# Patient Record
Sex: MALE | Race: WHITE | NOT HISPANIC OR LATINO
[De-identification: names, ages, dates, MRNs, and addresses within clinical notes are randomized per-mention and may not be internally consistent; named-entity substitution may affect disease eponyms.]

---

## 2018-03-02 ENCOUNTER — APPOINTMENT (OUTPATIENT)
Dept: PHYSICAL MEDICINE AND REHAB | Facility: CLINIC | Age: 83
End: 2018-03-02
Payer: MEDICARE

## 2018-03-02 VITALS
HEART RATE: 63 BPM | BODY MASS INDEX: 31.44 KG/M2 | TEMPERATURE: 97.5 F | OXYGEN SATURATION: 99 % | DIASTOLIC BLOOD PRESSURE: 87 MMHG | WEIGHT: 245 LBS | SYSTOLIC BLOOD PRESSURE: 153 MMHG | HEIGHT: 74 IN

## 2018-03-02 PROCEDURE — 99203 OFFICE O/P NEW LOW 30 MIN: CPT | Mod: GC

## 2018-04-13 ENCOUNTER — APPOINTMENT (OUTPATIENT)
Dept: PHYSICAL MEDICINE AND REHAB | Facility: CLINIC | Age: 83
End: 2018-04-13
Payer: MEDICARE

## 2018-04-13 VITALS
SYSTOLIC BLOOD PRESSURE: 145 MMHG | OXYGEN SATURATION: 98 % | DIASTOLIC BLOOD PRESSURE: 76 MMHG | HEART RATE: 64 BPM | TEMPERATURE: 97.5 F

## 2018-04-13 PROCEDURE — 99213 OFFICE O/P EST LOW 20 MIN: CPT

## 2018-04-20 ENCOUNTER — APPOINTMENT (OUTPATIENT)
Dept: GASTROENTEROLOGY | Facility: CLINIC | Age: 83
End: 2018-04-20
Payer: MEDICARE

## 2018-04-20 VITALS
WEIGHT: 240 LBS | HEIGHT: 74 IN | BODY MASS INDEX: 30.8 KG/M2 | SYSTOLIC BLOOD PRESSURE: 130 MMHG | TEMPERATURE: 96.9 F | DIASTOLIC BLOOD PRESSURE: 82 MMHG

## 2018-04-20 DIAGNOSIS — R19.5 OTHER FECAL ABNORMALITIES: ICD-10-CM

## 2018-04-20 PROCEDURE — 99203 OFFICE O/P NEW LOW 30 MIN: CPT

## 2018-04-24 ENCOUNTER — OUTPATIENT (OUTPATIENT)
Dept: OUTPATIENT SERVICES | Facility: HOSPITAL | Age: 83
LOS: 1 days | End: 2018-04-24
Payer: COMMERCIAL

## 2018-04-24 ENCOUNTER — APPOINTMENT (OUTPATIENT)
Dept: RADIOLOGY | Facility: CLINIC | Age: 83
End: 2018-04-24
Payer: MEDICARE

## 2018-04-24 DIAGNOSIS — Z00.8 ENCOUNTER FOR OTHER GENERAL EXAMINATION: ICD-10-CM

## 2018-04-24 PROCEDURE — 74018 RADEX ABDOMEN 1 VIEW: CPT

## 2018-04-24 PROCEDURE — 74018 RADEX ABDOMEN 1 VIEW: CPT | Mod: 26

## 2018-04-30 ENCOUNTER — RX RENEWAL (OUTPATIENT)
Age: 83
End: 2018-04-30

## 2018-05-07 ENCOUNTER — MED ADMIN CHARGE (OUTPATIENT)
Age: 83
End: 2018-05-07

## 2018-05-21 ENCOUNTER — APPOINTMENT (OUTPATIENT)
Dept: PHYSICAL MEDICINE AND REHAB | Facility: CLINIC | Age: 83
End: 2018-05-21
Payer: MEDICARE

## 2018-05-21 VITALS
HEART RATE: 64 BPM | OXYGEN SATURATION: 98 % | DIASTOLIC BLOOD PRESSURE: 78 MMHG | TEMPERATURE: 97.8 F | SYSTOLIC BLOOD PRESSURE: 149 MMHG

## 2018-05-21 PROCEDURE — 99214 OFFICE O/P EST MOD 30 MIN: CPT

## 2018-06-06 ENCOUNTER — APPOINTMENT (OUTPATIENT)
Dept: GASTROENTEROLOGY | Facility: CLINIC | Age: 83
End: 2018-06-06
Payer: MEDICARE

## 2018-06-06 VITALS
HEART RATE: 66 BPM | SYSTOLIC BLOOD PRESSURE: 132 MMHG | HEIGHT: 74 IN | TEMPERATURE: 97.6 F | BODY MASS INDEX: 31.06 KG/M2 | DIASTOLIC BLOOD PRESSURE: 80 MMHG | RESPIRATION RATE: 16 BRPM | WEIGHT: 242 LBS

## 2018-06-06 PROCEDURE — 99214 OFFICE O/P EST MOD 30 MIN: CPT

## 2018-06-19 ENCOUNTER — OTHER (OUTPATIENT)
Age: 83
End: 2018-06-19

## 2018-07-11 ENCOUNTER — APPOINTMENT (OUTPATIENT)
Dept: VASCULAR SURGERY | Facility: CLINIC | Age: 83
End: 2018-07-11

## 2018-08-07 ENCOUNTER — APPOINTMENT (OUTPATIENT)
Dept: VASCULAR SURGERY | Facility: CLINIC | Age: 83
End: 2018-08-07
Payer: MEDICARE

## 2018-08-07 ENCOUNTER — TRANSCRIPTION ENCOUNTER (OUTPATIENT)
Age: 83
End: 2018-08-07

## 2018-08-07 VITALS
SYSTOLIC BLOOD PRESSURE: 128 MMHG | BODY MASS INDEX: 31.44 KG/M2 | TEMPERATURE: 97.5 F | DIASTOLIC BLOOD PRESSURE: 80 MMHG | HEIGHT: 74 IN | WEIGHT: 245 LBS | HEART RATE: 71 BPM

## 2018-08-07 DIAGNOSIS — Z95.0 PRESENCE OF CARDIAC PACEMAKER: ICD-10-CM

## 2018-08-07 DIAGNOSIS — Z84.1 FAMILY HISTORY OF DISORDERS OF KIDNEY AND URETER: ICD-10-CM

## 2018-08-07 DIAGNOSIS — Z80.3 FAMILY HISTORY OF MALIGNANT NEOPLASM OF BREAST: ICD-10-CM

## 2018-08-07 DIAGNOSIS — Z78.9 OTHER SPECIFIED HEALTH STATUS: ICD-10-CM

## 2018-08-07 DIAGNOSIS — Z87.39 PERSONAL HISTORY OF OTHER DISEASES OF THE MUSCULOSKELETAL SYSTEM AND CONNECTIVE TISSUE: ICD-10-CM

## 2018-08-07 PROCEDURE — 99202 OFFICE O/P NEW SF 15 MIN: CPT

## 2018-08-07 PROCEDURE — 93970 EXTREMITY STUDY: CPT

## 2018-09-24 ENCOUNTER — APPOINTMENT (OUTPATIENT)
Dept: PHYSICAL MEDICINE AND REHAB | Facility: CLINIC | Age: 83
End: 2018-09-24
Payer: MEDICARE

## 2018-09-24 VITALS — HEART RATE: 64 BPM | OXYGEN SATURATION: 99 % | TEMPERATURE: 97.6 F

## 2018-09-24 DIAGNOSIS — M48.062 SPINAL STENOSIS, LUMBAR REGION WITH NEUROGENIC CLAUDICATION: ICD-10-CM

## 2018-09-24 PROCEDURE — 99213 OFFICE O/P EST LOW 20 MIN: CPT

## 2018-09-25 ENCOUNTER — TRANSCRIPTION ENCOUNTER (OUTPATIENT)
Age: 83
End: 2018-09-25

## 2018-12-03 ENCOUNTER — APPOINTMENT (OUTPATIENT)
Dept: PHYSICAL MEDICINE AND REHAB | Facility: CLINIC | Age: 83
End: 2018-12-03
Payer: MEDICARE

## 2018-12-03 VITALS — DIASTOLIC BLOOD PRESSURE: 87 MMHG | SYSTOLIC BLOOD PRESSURE: 140 MMHG | HEART RATE: 76 BPM

## 2018-12-03 PROCEDURE — 99213 OFFICE O/P EST LOW 20 MIN: CPT

## 2018-12-04 ENCOUNTER — TRANSCRIPTION ENCOUNTER (OUTPATIENT)
Age: 83
End: 2018-12-04

## 2019-04-01 ENCOUNTER — APPOINTMENT (OUTPATIENT)
Dept: PHYSICAL MEDICINE AND REHAB | Facility: CLINIC | Age: 84
End: 2019-04-01
Payer: MEDICARE

## 2019-04-01 VITALS — OXYGEN SATURATION: 98 % | SYSTOLIC BLOOD PRESSURE: 144 MMHG | DIASTOLIC BLOOD PRESSURE: 79 MMHG | HEART RATE: 67 BPM

## 2019-04-01 PROCEDURE — 99214 OFFICE O/P EST MOD 30 MIN: CPT

## 2019-04-01 NOTE — PHYSICAL EXAM
[Normal] : Oriented to person, place, and time, insight and judgement were intact and the affect was normal [de-identified] : The sclera and conjunctiva were normal [de-identified] : Ears and nose were normal in appearance [de-identified] : The appearance of the neck was normal, no visible neck mass [de-identified] : good respiratory effort, no resp distress, normal chest expansion [de-identified] : warm and well perfused [de-identified] : Mild lumbar spine tenderness along paraspinals [de-identified] : 4+/5 bl HFs/KE/ADF/APF [de-identified] : venous stasis changes of bl LEs [de-identified] : DTRs 1+, amb w rollator, fair-good balance.

## 2019-04-01 NOTE — REVIEW OF SYSTEMS
[Patient Intake Form Reviewed] : Patient intake form was reviewed [Joint Pain] : joint pain [Muscle Pain] : muscle pain [Difficulty Walking] : difficulty walking [Negative] : Psychiatric [Fatigue] : no fatigue [Headache] : no headaches [Dizziness] : no dizziness [Fainting] : no fainting [FreeTextEntry9] : Low Back Pain [de-identified] : no ulcers, chronic stasis changes

## 2019-04-01 NOTE — HISTORY OF PRESENT ILLNESS
[FreeTextEntry1] : Doing well. No new medical issues\par Still gets pain if he stands for a minute- pain starts to radiate down the legs to the feet.Feels a stretching feeling. \par Taking Gabapentin 400mg TID which he tolerates.\par Considering medical marijuana- planning to discuss with his PCP. \par Ambulating with rollator- has not had any falls recently. \par

## 2019-04-01 NOTE — ASSESSMENT
[FreeTextEntry1] : - Continue Gabapentin- increase from 400 mg TID to 400-400-600; Tyelenol prn\par - To discuss w PMD option of medical marijuana\par - Referred to PT for balance, LE strengthening\par - Can't get HARI due low platelets and h/o DVT\par - Discussed with patient red flags such as bladder/bowel incontinence, weakness, significant increase in pain level in which case patient should immediately present to Emergency Room.\par \par \par \par

## 2019-04-02 ENCOUNTER — TRANSCRIPTION ENCOUNTER (OUTPATIENT)
Age: 84
End: 2019-04-02

## 2019-06-26 ENCOUNTER — RX RENEWAL (OUTPATIENT)
Age: 84
End: 2019-06-26

## 2019-08-12 ENCOUNTER — RX RENEWAL (OUTPATIENT)
Age: 84
End: 2019-08-12

## 2019-10-10 ENCOUNTER — APPOINTMENT (OUTPATIENT)
Dept: GASTROENTEROLOGY | Facility: CLINIC | Age: 84
End: 2019-10-10
Payer: MEDICARE

## 2019-10-10 VITALS
WEIGHT: 248 LBS | SYSTOLIC BLOOD PRESSURE: 101 MMHG | DIASTOLIC BLOOD PRESSURE: 71 MMHG | HEIGHT: 74 IN | HEART RATE: 81 BPM | BODY MASS INDEX: 31.83 KG/M2

## 2019-10-10 DIAGNOSIS — R55 SYNCOPE AND COLLAPSE: ICD-10-CM

## 2019-10-10 DIAGNOSIS — R19.4 CHANGE IN BOWEL HABIT: ICD-10-CM

## 2019-10-10 DIAGNOSIS — E66.3 OVERWEIGHT: ICD-10-CM

## 2019-10-10 DIAGNOSIS — K21.9 GASTRO-ESOPHAGEAL REFLUX DISEASE W/OUT ESOPHAGITIS: ICD-10-CM

## 2019-10-10 DIAGNOSIS — N18.3 CHRONIC KIDNEY DISEASE, STAGE 3 (MODERATE): ICD-10-CM

## 2019-10-10 DIAGNOSIS — R13.10 DYSPHAGIA, UNSPECIFIED: ICD-10-CM

## 2019-10-10 DIAGNOSIS — K59.09 OTHER CONSTIPATION: ICD-10-CM

## 2019-10-10 DIAGNOSIS — E21.3 HYPERPARATHYROIDISM, UNSPECIFIED: ICD-10-CM

## 2019-10-10 DIAGNOSIS — E66.9 OBESITY, UNSPECIFIED: ICD-10-CM

## 2019-10-10 DIAGNOSIS — Z86.010 PERSONAL HISTORY OF COLONIC POLYPS: ICD-10-CM

## 2019-10-10 DIAGNOSIS — R60.0 LOCALIZED EDEMA: ICD-10-CM

## 2019-10-10 PROCEDURE — 82274 ASSAY TEST FOR BLOOD FECAL: CPT | Mod: QW

## 2019-10-10 PROCEDURE — 99204 OFFICE O/P NEW MOD 45 MIN: CPT

## 2019-10-10 PROCEDURE — 99214 OFFICE O/P EST MOD 30 MIN: CPT

## 2019-10-10 RX ORDER — GABAPENTIN 600 MG/1
600 TABLET, COATED ORAL
Qty: 30 | Refills: 2 | Status: DISCONTINUED | COMMUNITY
Start: 2019-04-01 | End: 2019-10-10

## 2019-10-10 NOTE — REVIEW OF SYSTEMS
[Feeling Tired] : feeling tired [Sore Throat] : sore throat [Muscle Weakness] : muscle weakness [Negative] : Heme/Lymph [As Noted in HPI] : as noted in HPI

## 2019-10-10 NOTE — HISTORY OF PRESENT ILLNESS
[FreeTextEntry1] : Mayo has been having issues with constipation for quite some time. He was seen by Dr. Vaughn last year, advised to take Metamucil, with improvement. However, this past summer, he needed to spend at least 10 minutes on the toilet, with an initial hard stool, then with sense of urgency, incomplete evacuation and frequent wiping of softer stool. He changed Metamucil dosing to 2 tablespoons at night, which seemed to work fairly well. He has never tried MiraLax. He recalls polyps noted at last colonoscopy around 2007. He also reports phlegm sticking in the throat, frequent coughing in order to mobilize it. He has had episodic nonprogressive dysphagia, with some early satiety and decreased appetite over the past six months, maintained on omeprazole 20 mg daily.

## 2019-10-10 NOTE — PHYSICAL EXAM
[General Appearance - Alert] : alert [General Appearance - In No Acute Distress] : in no acute distress [General Appearance - Well Nourished] : well nourished [General Appearance - Well Developed] : well developed [Outer Ear] : the ears and nose were normal in appearance [Sclera] : the sclera and conjunctiva were normal [Oropharynx] : the oropharynx was normal [Neck Cervical Mass (___cm)] : no neck mass was observed [Jugular Venous Distention Increased] : there was no jugular-venous distention [Neck Appearance] : the appearance of the neck was normal [Thyroid Nodule] : there were no palpable thyroid nodules [Thyroid Diffuse Enlargement] : the thyroid was not enlarged [Auscultation Breath Sounds / Voice Sounds] : lungs were clear to auscultation bilaterally [Heart Rate And Rhythm] : heart rate was normal and rhythm regular [Heart Sounds Pericardial Friction Rub] : no pericardial rub [Heart Sounds Gallop] : no gallops [Heart Sounds] : normal S1 and S2 [Full Pulse] : the pedal pulses are present [Abdomen Soft] : soft [Abdomen Tenderness] : non-tender [Bowel Sounds] : normal bowel sounds [] : no hepato-splenomegaly [Abdomen Mass (___ Cm)] : no abdominal mass palpated [Abdomen Hernia] : no hernia was discovered [Normal Sphincter Tone] : normal sphincter tone [No Rectal Mass] : no rectal mass [Prostate Size___ (Scale 0-4)] : prostate size was [unfilled] on a scale of 0-4 [Cervical Lymph Nodes Enlarged Anterior Bilaterally] : anterior cervical [Cervical Lymph Nodes Enlarged Posterior Bilaterally] : posterior cervical [Axillary Lymph Nodes Enlarged Bilaterally] : axillary [Supraclavicular Lymph Nodes Enlarged Bilaterally] : supraclavicular [Inguinal Lymph Nodes Enlarged Bilaterally] : inguinal [Skin Color & Pigmentation] : normal skin color and pigmentation [Femoral Lymph Nodes Enlarged Bilaterally] : femoral [Impaired Insight] : insight and judgment were intact [Oriented To Time, Place, And Person] : oriented to person, place, and time [Affect] : the affect was normal [Internal Hemorrhoid] : no internal hemorrhoids [External Hemorrhoid] : no external hemorrhoids [Occult Blood Positive] : stool was negative for occult blood [Prostate Tenderness] : was not tender [FreeTextEntry1] : uses a walker

## 2019-10-10 NOTE — ASSESSMENT
[FreeTextEntry1] : 1. Chronic constipation with subtle change in bowels, urgency, frequent wiping; remote history of colonic polyps--although underlying neoplasm is possible, we are looking for simple easy to treat conditions, such as underlying functional disorder, medications contributing to her bowel issues, etc.\par 2. Chronic GERD, with nonprogressive dysphagia, recent early satiety and decreased appetite.\par 3. Obesity.\par 4. 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 Hyperlipidemia.\par 7. BPH.\par 8. Low back pain, spinal stenosis.\par 9. Ex-smoker.\par 10. Hyperparathyroidism.\par 11. Status post cholecystectomy, T&A.\par 12. Allergic to Lovenox.\par \par Plan:\par 1. Medical records reviewed.\par 2. Dr. Soraya Mcdermott to forward labs for my review.\par 3. Change Metamucil dosing to 1 tablespoon twice daily rather than 2 tbsp at night.\par 4. If bowel symptoms continue to be problematic, try low-dose MiraLax daily.\par 5. No plans for colonoscopy or cross-sectional imaging at this time.\par 6. Return here as needed.

## 2019-10-10 NOTE — CONSULT LETTER
[Dear  ___] : Dear  [unfilled], [Please see my note below.] : Please see my note below. [Consult Closing:] : Thank you very much for allowing me to participate in the care of this patient.  If you have any questions, please do not hesitate to contact me. [Consult Letter:] : I had the pleasure of evaluating your patient, [unfilled]. [Sincerely,] : Sincerely, [FreeTextEntry3] : Kris Venegas M.D.\par

## 2019-11-21 ENCOUNTER — RX RENEWAL (OUTPATIENT)
Age: 84
End: 2019-11-21

## 2019-11-21 RX ORDER — GABAPENTIN 400 MG/1
400 CAPSULE ORAL
Qty: 60 | Refills: 1 | Status: ACTIVE | COMMUNITY
Start: 2019-08-12 | End: 1900-01-01

## 2020-01-31 ENCOUNTER — RX RENEWAL (OUTPATIENT)
Age: 85
End: 2020-01-31

## 2020-01-31 RX ORDER — GABAPENTIN 600 MG/1
600 TABLET, COATED ORAL
Qty: 30 | Refills: 3 | Status: ACTIVE | COMMUNITY
Start: 2019-08-12 | End: 1900-01-01

## 2020-07-24 ENCOUNTER — APPOINTMENT (OUTPATIENT)
Dept: VASCULAR SURGERY | Facility: CLINIC | Age: 85
End: 2020-07-24
Payer: MEDICARE

## 2020-07-24 VITALS
SYSTOLIC BLOOD PRESSURE: 144 MMHG | DIASTOLIC BLOOD PRESSURE: 81 MMHG | HEIGHT: 73 IN | WEIGHT: 230 LBS | BODY MASS INDEX: 30.48 KG/M2 | HEART RATE: 58 BPM

## 2020-07-24 VITALS — WEIGHT: 248 LBS | BODY MASS INDEX: 31.83 KG/M2 | HEIGHT: 74 IN | TEMPERATURE: 96.9 F

## 2020-07-24 PROCEDURE — 99203 OFFICE O/P NEW LOW 30 MIN: CPT | Mod: 25

## 2020-07-24 PROCEDURE — 93970 EXTREMITY STUDY: CPT

## 2020-07-24 PROCEDURE — 29580 STRAPPING UNNA BOOT: CPT | Mod: RT

## 2020-07-24 RX ORDER — GABAPENTIN 300 MG/1
300 CAPSULE ORAL 3 TIMES DAILY
Qty: 90 | Refills: 0 | Status: COMPLETED | COMMUNITY
Start: 2018-04-30 | End: 2020-07-24

## 2020-07-24 RX ORDER — GABAPENTIN 100 MG/1
100 CAPSULE ORAL 3 TIMES DAILY
Qty: 90 | Refills: 2 | Status: COMPLETED | COMMUNITY
Start: 2018-04-13 | End: 2020-07-24

## 2020-07-24 RX ORDER — GABAPENTIN 400 MG/1
400 CAPSULE ORAL TWICE DAILY
Qty: 60 | Refills: 2 | Status: COMPLETED | COMMUNITY
Start: 2019-04-01 | End: 2020-07-24

## 2020-07-24 RX ORDER — GABAPENTIN 400 MG/1
400 CAPSULE ORAL 3 TIMES DAILY
Qty: 90 | Refills: 2 | Status: COMPLETED | COMMUNITY
Start: 2018-05-21 | End: 2020-07-24

## 2020-07-24 NOTE — HISTORY OF PRESENT ILLNESS
[FreeTextEntry1] : 89 yo male with a history of dvt s/p ivc filter (2005) on coumadin, s/p ppm, hld, presents for evaluation of lower extremity dvt found on sonogram and left lower extremity traumatic wound after fall on july 4th.  pt completed course of antibiotics.  pt denies any fevers or chills.  pt states that prior to fall was wearing compression stockings regularly.  pt denies any history of claudications or rest pain.  pt states that he has chronic back pain and that limits his walking but is able to ambulate well with with assistance of a walker.  pt denies any foot wounds or ulcers \par pt denies any chest pain or sob\par pt has been applying cool compress to the open wound on the anterior shin and states that the wound is healing slowly but is still rather painful

## 2020-07-24 NOTE — PHYSICAL EXAM
[2+] : right 2+ [] : bilaterally [Ankle Swelling Bilaterally] : severe [No Rash or Lesion] : No rash or lesion [Calm] : calm [Alert] : alert [JVD] : no jugular venous distention  [Normal Breath Sounds] : Normal breath sounds [Normal Heart Sounds] : normal heart sounds [Varicose Veins Of Lower Extremities] : not present [Abdomen Masses] : No abdominal masses [de-identified] : appears well [Ankle Swelling (On Exam)] : not present [de-identified] : wound with overlying eschar over the dorsum of the proximal shin with what appears to be underling hematoma.  no active bleeding no discharge, tender to palpation  [de-identified] : no calf tenderness

## 2020-07-24 NOTE — ASSESSMENT
[FreeTextEntry1] : 89 yo male with a history of dvt s/p ivc filter (2005) on coumadin, s/p ppm, hld, presents for evaluation of lower extremity dvt found on sonogram and left lower extremity traumatic wound after fall on july 4th.\par \par duplex shows b/l chronic dvt, no evidence of acute dvt, left mid anterior shin with hematoma noted\par \par will start unna boot pt to follow up in 1 weeks for unna boot change

## 2020-07-30 ENCOUNTER — APPOINTMENT (OUTPATIENT)
Dept: VASCULAR SURGERY | Facility: CLINIC | Age: 85
End: 2020-07-30
Payer: MEDICARE

## 2020-07-30 VITALS
BODY MASS INDEX: 30.48 KG/M2 | SYSTOLIC BLOOD PRESSURE: 140 MMHG | WEIGHT: 230 LBS | HEIGHT: 73 IN | HEART RATE: 62 BPM | DIASTOLIC BLOOD PRESSURE: 85 MMHG

## 2020-07-30 VITALS — TEMPERATURE: 97.8 F

## 2020-07-30 PROCEDURE — 99213 OFFICE O/P EST LOW 20 MIN: CPT | Mod: 25

## 2020-07-30 PROCEDURE — 29580 STRAPPING UNNA BOOT: CPT | Mod: LT

## 2020-07-30 NOTE — PHYSICAL EXAM
[2+] : right 2+ [Ankle Swelling On The Right] : of the right ankle [Ankle Swelling On The Left] : moderate [Ankle Swelling (On Exam)] : present [] : present [Ankle Swelling Bilaterally] : severe [No Rash or Lesion] : No rash or lesion [JVD] : no jugular venous distention  [Varicose Veins Of Lower Extremities] : not present [de-identified] : appears well [de-identified] : hematoma decreased in size over the left anterior shin with small overlying break in the skin

## 2020-07-30 NOTE — ASSESSMENT
[FreeTextEntry1] : 89 yo male with a history of dvt s/p ivc filter (2005) on coumadin, s/p ppm, hld, chronic lower extremity dvt found on sonogram and left lower extremity traumatic wound after fall on july 4th presents today for unna boot change\par \par wound healing nicely no discharge\par unna boot applied to the left lower extremity \par encouraged pt to wear compression stockings of the right lower extremity

## 2020-07-30 NOTE — HISTORY OF PRESENT ILLNESS
[FreeTextEntry1] : 89 yo male with a history of dvt s/p ivc filter (2005) on coumadin, s/p ppm, hld, chronic lower extremity dvt found on sonogram and left lower extremity traumatic wound after fall on july 4th presents today for unna boot change.  pt without any complaints at this time.  \par wound over the left anterior shin reports had some bleeding after applying unna boot

## 2020-08-06 ENCOUNTER — APPOINTMENT (OUTPATIENT)
Dept: VASCULAR SURGERY | Facility: CLINIC | Age: 85
End: 2020-08-06
Payer: MEDICARE

## 2020-08-06 VITALS
BODY MASS INDEX: 30.48 KG/M2 | WEIGHT: 230 LBS | HEART RATE: 66 BPM | TEMPERATURE: 97.9 F | SYSTOLIC BLOOD PRESSURE: 132 MMHG | DIASTOLIC BLOOD PRESSURE: 84 MMHG | HEIGHT: 73 IN

## 2020-08-06 PROCEDURE — 99213 OFFICE O/P EST LOW 20 MIN: CPT | Mod: 25

## 2020-08-06 PROCEDURE — 29580 STRAPPING UNNA BOOT: CPT | Mod: RT

## 2020-08-06 NOTE — PHYSICAL EXAM
[2+] : left 2+ [] : of the left leg [Ankle Swelling On The Left] : moderate [Alert] : alert [JVD] : no jugular venous distention  [de-identified] : appears well  [de-identified] : hematoma over the left anterior shin decreased in size with overlying eschar, expressed small amount of coagulated blood no puss noted, no surrounding erythema, small underlying ulcer beneath overlying eschar  [Ankle Swelling (On Exam)] : not present

## 2020-08-06 NOTE — HISTORY OF PRESENT ILLNESS
[FreeTextEntry1] : 89 yo male with a history of dvt s/p ivc filter (2005) on coumadin, s/p ppm, hld, chronic lower extremity dvt found on sonogram and left lower extremity traumatic wound after fall on july 4th presents today for unna boot change.  pt without any complaints at this time. states that the pain is improved  \par

## 2020-08-06 NOTE — ASSESSMENT
[FreeTextEntry1] : 89 yo male with a history of dvt s/p ivc filter (2005) on coumadin, s/p ppm, hld, chronic lower extremity dvt found on sonogram and left lower extremity traumatic wound after fall on july 4th presents today for unna boot change\par \par hematoma decreased in size with overlying eschar and small underlying ulceration no signs of infection \par unna boot placed and pt to follow up in 1 week

## 2020-08-12 ENCOUNTER — APPOINTMENT (OUTPATIENT)
Dept: VASCULAR SURGERY | Facility: CLINIC | Age: 85
End: 2020-08-12
Payer: MEDICARE

## 2020-08-12 VITALS — BODY MASS INDEX: 30.48 KG/M2 | TEMPERATURE: 96.6 F | HEIGHT: 73 IN | WEIGHT: 230 LBS

## 2020-08-12 PROCEDURE — 99212 OFFICE O/P EST SF 10 MIN: CPT

## 2020-08-12 NOTE — PHYSICAL EXAM
[JVD] : no jugular venous distention  [2+] : left 2+ [Ankle Swelling (On Exam)] : not present [] : of the left leg [Ankle Swelling On The Left] : moderate [Alert] : alert [Calm] : calm [de-identified] : appears well  [de-identified] : hematoma over the left anterior shin decreased in size with overlying eschar was debrided to reveal about 1 cm ulcer over the anterior shin and new superficial wound over the lateral aspect of the ulcer - desquamation of the skin

## 2020-08-12 NOTE — HISTORY OF PRESENT ILLNESS
[FreeTextEntry1] : 89 yo male with a history of dvt s/p ivc filter (2005) on coumadin, s/p ppm, hld, chronic lower extremity dvt found on sonogram and left lower extremity traumatic wound after fall on july 4th presents today for unna boot change.  pt without any complaints at this time. states that he did not experience any pain this week and the unna boot felt "soothing"\par

## 2020-08-12 NOTE — ASSESSMENT
[FreeTextEntry1] : 89 yo male with a history of dvt s/p ivc filter (2005) on coumadin, s/p ppm, hld, chronic lower extremity dvt found on sonogram and left lower extremity traumatic wound after fall on july 4th presents today for unna boot change\par \par silvadine applied to the ulcer and desquamated skin and unna boot replaced \par \par pt to follow up in 1 week for unna boot change

## 2020-08-20 ENCOUNTER — APPOINTMENT (OUTPATIENT)
Dept: VASCULAR SURGERY | Facility: CLINIC | Age: 85
End: 2020-08-20
Payer: MEDICARE

## 2020-08-20 VITALS
WEIGHT: 230 LBS | BODY MASS INDEX: 30.48 KG/M2 | HEART RATE: 65 BPM | SYSTOLIC BLOOD PRESSURE: 127 MMHG | DIASTOLIC BLOOD PRESSURE: 86 MMHG | HEIGHT: 73 IN

## 2020-08-20 VITALS — TEMPERATURE: 97.1 F

## 2020-08-20 PROBLEM — N18.3 CHRONIC RENAL INSUFFICIENCY, STAGE III (MODERATE): Status: ACTIVE | Noted: 2019-10-10

## 2020-08-20 PROCEDURE — 99213 OFFICE O/P EST LOW 20 MIN: CPT | Mod: 25

## 2020-08-20 PROCEDURE — 29580 STRAPPING UNNA BOOT: CPT | Mod: LT

## 2020-08-20 NOTE — ASSESSMENT
[FreeTextEntry1] : 89 yo male with a history of dvt s/p ivc filter (2005) on Coumadin, s/p ppm, hld, chronic lower extremity dvt found on sonogram and left lower extremity traumatic wound after fall on july 4th presents today for unna boot change\par \par ulcer healing well small 0.5cm punched out ulcer over the anterior shin \par unna boot applied to the left lower extremity \par pt to follow up in 1 week for unna boot change

## 2020-08-20 NOTE — PHYSICAL EXAM
[2+] : left 2+ [] : of the left leg [Alert] : alert [Calm] : calm [Ankle Swelling On The Left] : moderate [Varicose Veins Of Lower Extremities] : not present [Ankle Swelling (On Exam)] : not present [JVD] : no jugular venous distention  [de-identified] : small punch out ulcer over the anterior shin with flattened hematoma, old hematoma debrided with granular base  [de-identified] : appears well

## 2020-08-27 ENCOUNTER — APPOINTMENT (OUTPATIENT)
Dept: VASCULAR SURGERY | Facility: CLINIC | Age: 85
End: 2020-08-27
Payer: MEDICARE

## 2020-08-27 VITALS
BODY MASS INDEX: 30.48 KG/M2 | WEIGHT: 230 LBS | HEIGHT: 73 IN | SYSTOLIC BLOOD PRESSURE: 139 MMHG | DIASTOLIC BLOOD PRESSURE: 84 MMHG | HEART RATE: 69 BPM

## 2020-08-27 VITALS — TEMPERATURE: 97.1 F

## 2020-08-27 PROCEDURE — 29580 STRAPPING UNNA BOOT: CPT | Mod: LT

## 2020-08-27 NOTE — PHYSICAL EXAM
[JVD] : no jugular venous distention  [2+] : left 2+ [Ankle Swelling (On Exam)] : not present [Varicose Veins Of Lower Extremities] : not present [] : of the left leg [Ankle Swelling On The Left] : moderate [Alert] : alert [Calm] : calm [de-identified] : appears well  [de-identified] : small punch out ulcer over the anterior shin with flattened hematoma, old hematoma debrided with granular base

## 2020-08-27 NOTE — ASSESSMENT
[FreeTextEntry1] : 89 yo male with a history of dvt s/p ivc filter (2005) on Coumadin, s/p ppm, hld, chronic lower extremity dvt found on sonogram and left lower extremity traumatic wound after fall on july 4th presents today for unna boot change\par \par ulcer healing well small 0.5cm punched out ulcer over the anterior shin \par unna boot applied to the left lower extremity \par pt to follow up in 1 week for unna boot change

## 2020-08-27 NOTE — HISTORY OF PRESENT ILLNESS
[FreeTextEntry1] : 89 yo male with a history of dvt s/p ivc filter (2005) on coumadin, s/p ppm, hld, chronic lower extremity dvt found on sonogram and left lower extremity traumatic wound after fall on july 4th presents today for unna boot change.  pt without any complaints at this time only some occasional stabbing pain this week \par

## 2020-09-03 ENCOUNTER — APPOINTMENT (OUTPATIENT)
Dept: VASCULAR SURGERY | Facility: CLINIC | Age: 85
End: 2020-09-03
Payer: MEDICARE

## 2020-09-03 VITALS — DIASTOLIC BLOOD PRESSURE: 70 MMHG | SYSTOLIC BLOOD PRESSURE: 119 MMHG | HEART RATE: 59 BPM

## 2020-09-03 VITALS — TEMPERATURE: 96.6 F

## 2020-09-03 PROCEDURE — 99213 OFFICE O/P EST LOW 20 MIN: CPT

## 2020-09-03 NOTE — PHYSICAL EXAM
[2+] : left 2+ [] : of the left leg [Ankle Swelling Bilaterally] : severe [JVD] : no jugular venous distention  [Ankle Swelling (On Exam)] : not present [de-identified] : appears well  [de-identified] : skin is intact ulcer now healed

## 2020-09-03 NOTE — HISTORY OF PRESENT ILLNESS
[FreeTextEntry1] : 89 yo male with a history of dvt s/p ivc filter (2005) on coumadin, s/p ppm, hld, chronic lower extremity dvt found on sonogram and left lower extremity traumatic wound after fall on july 4th presents today for unna boot change.  pt without any pain of the lower extremities\par

## 2020-09-03 NOTE — ASSESSMENT
[FreeTextEntry1] : 89 yo male with a history of dvt s/p ivc filter (2005) on coumadin, s/p ppm, hld, presents for evaluation of lower extremity dvt found on sonogram and left lower extremity traumatic wound after fall on july 4th.\par \par duplex shows b/l chronic dvt, no evidence of acute dvt, left mid anterior shin with hematoma noted\par \par ulcer now healed \par will d/c unna boot and pt advised to resume the use of compression stockings\par pt to follow up in 2-3 weeks

## 2020-09-29 ENCOUNTER — APPOINTMENT (OUTPATIENT)
Dept: VASCULAR SURGERY | Facility: CLINIC | Age: 85
End: 2020-09-29
Payer: MEDICARE

## 2020-09-29 VITALS
WEIGHT: 230 LBS | SYSTOLIC BLOOD PRESSURE: 135 MMHG | BODY MASS INDEX: 30.48 KG/M2 | HEIGHT: 73 IN | HEART RATE: 59 BPM | DIASTOLIC BLOOD PRESSURE: 82 MMHG

## 2020-09-29 PROCEDURE — 99213 OFFICE O/P EST LOW 20 MIN: CPT

## 2020-09-29 NOTE — ASSESSMENT
[FreeTextEntry1] : 89 yo male with a history of dvt s/p ivc filter (2005) on coumadin, s/p ppm, hld, chronic lower extremity dvt found on sonogram and left lower extremity traumatic wound after fall on july 4th presents today for follow up after treatment with unna boots\par left lower extremity ulcer is healed, patient doing well with compression stockings \par pt to follow up as needed

## 2020-09-29 NOTE — PHYSICAL EXAM
[2+] : left 2+ [Ankle Swelling (On Exam)] : present [Ankle Swelling On The Right] : mild [] : of the left leg [Ankle Swelling On The Left] : moderate [No Rash or Lesion] : No rash or lesion [Alert] : alert [JVD] : no jugular venous distention  [Varicose Veins Of Lower Extremities] : not present [Skin Ulcer] : no ulcer [de-identified] : appears well

## 2021-06-22 ENCOUNTER — APPOINTMENT (OUTPATIENT)
Dept: VASCULAR SURGERY | Facility: CLINIC | Age: 86
End: 2021-06-22
Payer: MEDICARE

## 2021-06-22 PROCEDURE — 99213 OFFICE O/P EST LOW 20 MIN: CPT

## 2021-06-22 PROCEDURE — 93923 UPR/LXTR ART STDY 3+ LVLS: CPT

## 2021-06-22 NOTE — PHYSICAL EXAM
[0] : left 0 [Ankle Swelling (On Exam)] : present [Varicose Veins Of Lower Extremities] : present [Ankle Swelling On The Left] : moderate [] : bilaterally [Ankle Swelling Bilaterally] : severe [No Rash or Lesion] : No rash or lesion [Alert] : alert [Calm] : calm [JVD] : no jugular venous distention  [Normal Breath Sounds] : Normal breath sounds [Normal Rate and Rhythm] : normal rate and rhythm [2+] : left 2+ [Skin Ulcer] : no ulcer [de-identified] : appears well

## 2021-06-22 NOTE — HISTORY OF PRESENT ILLNESS
[FreeTextEntry1] : 89 yo male with a history of dvt s/p ivc filter (2005) on Coumadin, s/p ppm, hld, chronic lower extremity dvt found on sonogram and left lower extremity traumatic wound after fall on july 4th presents today for follow up of b/l lower extremity skin discoloration \par pt without any complaints at this time. pt denies any lower extremity pain or ulcers \par pt has been wearing compression stockings\par

## 2021-06-22 NOTE — ASSESSMENT
[FreeTextEntry1] : 89 yo male with a history of dvt s/p ivc filter (2005) on coumadin, s/p ppm, hld, chronic lower extremity dvt found on sonogram and left lower extremity traumatic wound after fall on july 4th presents today for follow up of b/l lower extremity skin discoloration \par \par triston/pvr shows no evidence of arterial disease \par \par encouraged pt to wear compression stockings and elevate the lower extremities \par \par pt to follow up as needed

## 2021-07-07 ENCOUNTER — EMERGENCY (EMERGENCY)
Facility: HOSPITAL | Age: 86
LOS: 1 days | Discharge: HOME | End: 2021-07-07
Attending: EMERGENCY MEDICINE | Admitting: EMERGENCY MEDICINE
Payer: MEDICARE

## 2021-07-07 VITALS
OXYGEN SATURATION: 99 % | DIASTOLIC BLOOD PRESSURE: 85 MMHG | TEMPERATURE: 98 F | HEART RATE: 76 BPM | RESPIRATION RATE: 16 BRPM | SYSTOLIC BLOOD PRESSURE: 158 MMHG

## 2021-07-07 VITALS
TEMPERATURE: 98 F | OXYGEN SATURATION: 100 % | HEIGHT: 74 IN | DIASTOLIC BLOOD PRESSURE: 78 MMHG | RESPIRATION RATE: 16 BRPM | HEART RATE: 78 BPM | SYSTOLIC BLOOD PRESSURE: 134 MMHG

## 2021-07-07 LAB
ALBUMIN SERPL ELPH-MCNC: 4.3 G/DL — SIGNIFICANT CHANGE UP (ref 3.3–5)
ALP SERPL-CCNC: 70 U/L — SIGNIFICANT CHANGE UP (ref 40–120)
ALT FLD-CCNC: 19 U/L — SIGNIFICANT CHANGE UP (ref 4–41)
ANION GAP SERPL CALC-SCNC: 17 MMOL/L — HIGH (ref 7–14)
AST SERPL-CCNC: 34 U/L — SIGNIFICANT CHANGE UP (ref 4–40)
BASOPHILS # BLD AUTO: 0.01 K/UL — SIGNIFICANT CHANGE UP (ref 0–0.2)
BASOPHILS NFR BLD AUTO: 0.2 % — SIGNIFICANT CHANGE UP (ref 0–2)
BILIRUB SERPL-MCNC: 0.7 MG/DL — SIGNIFICANT CHANGE UP (ref 0.2–1.2)
BUN SERPL-MCNC: 57 MG/DL — HIGH (ref 7–23)
CALCIUM SERPL-MCNC: 9.7 MG/DL — SIGNIFICANT CHANGE UP (ref 8.4–10.5)
CHLORIDE SERPL-SCNC: 106 MMOL/L — SIGNIFICANT CHANGE UP (ref 98–107)
CO2 SERPL-SCNC: 16 MMOL/L — LOW (ref 22–31)
CREAT SERPL-MCNC: 1.86 MG/DL — HIGH (ref 0.5–1.3)
EOSINOPHIL # BLD AUTO: 0.11 K/UL — SIGNIFICANT CHANGE UP (ref 0–0.5)
EOSINOPHIL NFR BLD AUTO: 1.7 % — SIGNIFICANT CHANGE UP (ref 0–6)
GLUCOSE SERPL-MCNC: 113 MG/DL — HIGH (ref 70–99)
HCT VFR BLD CALC: 43.4 % — SIGNIFICANT CHANGE UP (ref 39–50)
HGB BLD-MCNC: 13.6 G/DL — SIGNIFICANT CHANGE UP (ref 13–17)
IANC: 4.74 K/UL — SIGNIFICANT CHANGE UP (ref 1.5–8.5)
IMM GRANULOCYTES NFR BLD AUTO: 0.3 % — SIGNIFICANT CHANGE UP (ref 0–1.5)
LYMPHOCYTES # BLD AUTO: 1.09 K/UL — SIGNIFICANT CHANGE UP (ref 1–3.3)
LYMPHOCYTES # BLD AUTO: 16.9 % — SIGNIFICANT CHANGE UP (ref 13–44)
MCHC RBC-ENTMCNC: 28.8 PG — SIGNIFICANT CHANGE UP (ref 27–34)
MCHC RBC-ENTMCNC: 31.3 GM/DL — LOW (ref 32–36)
MCV RBC AUTO: 91.8 FL — SIGNIFICANT CHANGE UP (ref 80–100)
MONOCYTES # BLD AUTO: 0.48 K/UL — SIGNIFICANT CHANGE UP (ref 0–0.9)
MONOCYTES NFR BLD AUTO: 7.4 % — SIGNIFICANT CHANGE UP (ref 2–14)
NEUTROPHILS # BLD AUTO: 4.74 K/UL — SIGNIFICANT CHANGE UP (ref 1.8–7.4)
NEUTROPHILS NFR BLD AUTO: 73.5 % — SIGNIFICANT CHANGE UP (ref 43–77)
NRBC # BLD: 0 /100 WBCS — SIGNIFICANT CHANGE UP
NRBC # FLD: 0 K/UL — SIGNIFICANT CHANGE UP
PLATELET # BLD AUTO: 108 K/UL — LOW (ref 150–400)
POTASSIUM SERPL-MCNC: 4.9 MMOL/L — SIGNIFICANT CHANGE UP (ref 3.5–5.3)
POTASSIUM SERPL-SCNC: 4.9 MMOL/L — SIGNIFICANT CHANGE UP (ref 3.5–5.3)
PROT SERPL-MCNC: 6.9 G/DL — SIGNIFICANT CHANGE UP (ref 6–8.3)
RBC # BLD: 4.73 M/UL — SIGNIFICANT CHANGE UP (ref 4.2–5.8)
RBC # FLD: 15.1 % — HIGH (ref 10.3–14.5)
SODIUM SERPL-SCNC: 139 MMOL/L — SIGNIFICANT CHANGE UP (ref 135–145)
TROPONIN T, HIGH SENSITIVITY RESULT: 53 NG/L — CRITICAL HIGH
WBC # BLD: 6.45 K/UL — SIGNIFICANT CHANGE UP (ref 3.8–10.5)
WBC # FLD AUTO: 6.45 K/UL — SIGNIFICANT CHANGE UP (ref 3.8–10.5)

## 2021-07-07 PROCEDURE — 99285 EMERGENCY DEPT VISIT HI MDM: CPT | Mod: 25

## 2021-07-07 PROCEDURE — 93010 ELECTROCARDIOGRAM REPORT: CPT

## 2021-07-07 RX ORDER — SODIUM CHLORIDE 9 MG/ML
1000 INJECTION, SOLUTION INTRAVENOUS ONCE
Refills: 0 | Status: COMPLETED | OUTPATIENT
Start: 2021-07-07 | End: 2021-07-07

## 2021-07-07 RX ADMIN — SODIUM CHLORIDE 1000 MILLILITER(S): 9 INJECTION, SOLUTION INTRAVENOUS at 20:02

## 2021-07-07 NOTE — ED PROVIDER NOTE - PATIENT PORTAL LINK FT
You can access the FollowMyHealth Patient Portal offered by [facility] by registering at the following website: [email or web address]. By joining [facility]’s FollowMyHealth portal, you will also be able to view your health information using other applications (apps) compatible with our system.

## 2021-07-07 NOTE — ED ADULT NURSE NOTE - INTERVENTIONS DEFINITIONS
[place] to call system/Call bell, personal items and telephone within reach/Instruct patient to call for assistance/Room bathroom lighting operational/Non-slip footwear when patient is off stretcher/Provide visual cue, wrist band, yellow gown, etc./Monitor gait and stability/Provide visual clues: red socks

## 2021-07-07 NOTE — ED PROVIDER NOTE - PROGRESS NOTE DETAILS
Patient signed out to Dr. Anitha Guy, stable at this time and first liter of fluids is still running. Labs indicate increase in Cr to 1.86 from baseline around ~1.2, BUN elevated as well, likely pre-renal MILAGROS, troponins slightly elevated will repeat, although patient has known CKD which may be affecting this value. Malena GATES (PGY-3): Patient states he does not want to stay for CT scan, discussed risk and benefits of leaving without Ct scan in setting of RLQ abd pain. Patient verbalized understanding of risk. Agreeable to wait for rpt troponin, tolerated PO, pending rpt labs, will dc with strict return precations and f/u instructions Malena GATES (PGY-3): Patient states he does not want to stay for CT scan, discussed risk and benefits of leaving without Ct scan in setting of RLQ abd pain. Patient verbalized understanding of risk. Agreeable to wait for rpt troponin, tolerated PO, pending rpt labs, will dc with strict return precautions and f/u instructions

## 2021-07-07 NOTE — ED ADULT NURSE NOTE - OBJECTIVE STATEMENT
Pt received AOx4, ambulatory w/ cane at baseline w. pmhx od DVT, CKD, pacemaker presents to the ED w/ chief complaint of dizziness, sensation of passing out for the past 8 days. Pt states he ate out w/ his friend about a week ago and ended up having diarrhea. Ever since then, he started having decrease in appetite and generalized weakness. Pt also reports that he has been having intermittent headache for the past 3 days. Pt states BM looks like "undigested food". Pt also states he lost about 12 lbs in the past week. 20G placed LLF. Redness/skin discoloration noted to b/l LE. VS as noted. Safety precautions maintained.

## 2021-07-07 NOTE — ED PROVIDER NOTE - SHIFT CHANGE DETAILS
TETE:  Patient signed out to Dr. Mendoza pending ctap and reassessment. HD stable at time of signout.

## 2021-07-07 NOTE — ED PROVIDER NOTE - PMH
Deviated Septum    DVT of Lower Extremity (Deep Venous Thrombosis)  s/p green field filter placement  Hyperlipidemia    Peripheral arterial disease  PAD of lower extremities  Spinal stenosis of lumbar region  s/p spinal surgery in 1995  Syncope and Collapse    Tremor  intentional tremors of hands

## 2021-07-07 NOTE — ED ADULT TRIAGE NOTE - CHIEF COMPLAINT QUOTE
pt c/o diarrhea , decreased PO intake, dizziness like the room is spinning x 8 days, fatigue, intermittent headache x 3 days. pt states diarrhea is not present as of today.  hx of pacemaker (on warfarin), venacava filter, tremor, dvt. Consent Type: Consent 1 (Standard)

## 2021-07-07 NOTE — ED PROVIDER NOTE - PSH
H/O  TURP (Transurethral Resection of Prostate)    H/O Pacemaker  last replaced in april of 2011.  S/P Cholecystectomy    S/P tonsillectomy  in 1932

## 2021-07-07 NOTE — ED PROVIDER NOTE - EKG ADDITIONAL INFORMATION FREE TEXT
Normal sinus rhythm at 74 bpm, [place] of 280ms consistent with 1st degree AV block. Left axis deviation of -45 but otherwise normal intervals. Normal sinus rhythm at 74 bpm, [place] of 280ms consistent with 1st degree AV block. Left axis deviation of -45 but otherwise normal intervals. Poor R-wave progression and ST flattening in I, III, aVL, V1 is nonspecific.

## 2021-07-07 NOTE — ED PROVIDER NOTE - ATTENDING CONTRIBUTION TO CARE
I have personally performed a face to face bedside history and physical examination of this patient. I have discussed the history, examination, review of systems, assessment and plan of management with the resident. I have reviewed the electronic medical record and amended it to reflect my history, review of systems, physical exam, assessment and plan.    Brief HPI:  89 M pmh DVT and chronic DVT with IVC (placed 2005), CKD stage III and pacemaker presents with 8 days of lightheadedness following one day of non-bloody diarrhea after eating out with friends on 6/28.  Patient states that he has been feeling lightheaded, poor appetite and fluid intake.  No room spinning sensation.  There was intermittent headache since symptom onset, however not feeling now.  Denies fever, chills, chest pain, vomiting, dysuria, hematuria.  No recent travel, sick contacts.  Denies etoh or illicit drug use.      Vitals:   Reviewed    Exam:    GEN:  Non-toxic appearing, non-distressed, speaking full sentences, non-diaphoretic, AAOx3  HEENT:  NCAT, neck supple, EOMI, PERRLA, sclera anicteric, no conjunctival pallor or injection, no stridor, normal voice, no tonsillar exudate, uvula midline, tympanic membranes and external auditory canals normal appearing bilaterally   CV:  regular rhythm and rate, s1/s2 audible, no murmurs, rubs or gallops, peripheral pulses 2+ and symmetric  PULM:  non-labored respirations, lungs clear to auscultation bilaterally, no wheezes, crackles or rales  ABD:  non distended, mild ttp rlq, no rebound, no guarding, negative Mtz's sign, bowel sounds normal, no cvat  MSK:  no gross deformity, non-tender extremities and joints, range of motion grossly normal appearing, no extremity edema, extremities warm and well perfused   NEURO:  AAOx3, CN II-XII intact, motor 5/5 in upper and lower extremities bilaterally, sensation grossly intact in extremities and trunk  SKIN:  warm, dry, no rash or vesicles     A/P:  89 M pmh DVT and chronic DVT with IVC (placed 2005), CKD stage III and pacemaker presents with 8 days of lightheadedness following one day of non-bloody diarrhea after eating out with friends on 6/28.  VSS AF.  Exam non-toxic appearing, no focal neuro deficit, abdomen with mild ttp in rlq but non-peritoneal.  Possible viral syndrome.  Low c/f acute surgical pathology of abdomen.  Will send labs, ctap, supportive care.  Dispo pending.

## 2021-07-07 NOTE — ED PROVIDER NOTE - PHYSICAL EXAMINATION
General: awake and alert gentleman lying comfortably in bed, able to sit up for exam.  Heart: Regular rate and rhythm, no murmurs or gallops appreciated  Lungs: Basilar rales bilaterally, apices clear. Good respiratory effort.  Abdomen: Soft nondistended nontender abdomen in all quadrants. No rashes or breaks in the skin.  Extremity: Bilateral feet are warm and dark red/purple to the knee, blanches with pressure. Appropriate capillary refill, no edema. No injuries or breaks in the skin on the feet or knees.  Neuro: intention tremor bilaterally. 5/5 strength upper and lower extremity, speech appropriate with no pronunciation defecits. General: awake and alert gentleman lying comfortably in bed, able to sit up for exam.  Heart: Regular rate and rhythm, no murmurs or gallops appreciated  Lungs: Basilar rales bilaterally, apices clear. Good respiratory effort.  Abdomen: Soft nondistended nontender abdomen in all quadrants. No rashes or breaks in the skin.  Extremity: Bilateral feet are warm and dark red/purple to the knee, blanches with pressure. Appropriate capillary refill, no edema. No injuries or breaks in the skin on the feet or knees.  Neuro: intention tremor bilaterally. 5/5 strength upper and lower extremity, speech appropriate with no pronunciation defecits.    Orthostatic Blood Pressures  (20:31)  Lying flat:  /93   HR 68  Sitting upright:  /118   HR 79  Standing:   /87    HR 86

## 2021-07-07 NOTE — ED PROVIDER NOTE - OBJECTIVE STATEMENT
89M in with past medical history of DVT and chronic DVT with IVC (placed 2005), CKD stage III and pacemaker presents with 8 days of lightheadedness following one day of non-bloody diarrhea after eating out with friends on 6/28. He has lost 12 pounds in the last 8 days, and has had minimal appetite but denies nausea or vomiting. Bowel movements have been small and "look like undigested food" since symptom onset. Intermittent headache occurred for 3 days and has now resolved. Reports that his eyes have been itchy otherwise no other symptoms, has not passed out or fallen. Patient reports some exertional dyspnea worse than usual. Denies fever, chest pain, heart palpitations, abdominal pain, new urinary symptoms, leg pain, extremity swelling, asymmetric weakness.

## 2021-07-07 NOTE — ED PROVIDER NOTE - CARE PLAN
Principal Discharge DX:	Diarrhea  Secondary Diagnosis:	Abdominal pain  Secondary Diagnosis:	Decreased appetite

## 2021-07-07 NOTE — ED PROVIDER NOTE - CLINICAL SUMMARY MEDICAL DECISION MAKING FREE TEXT BOX
89M with past medical history of DVT with IVC filter, CKD, and pacemaker (placed for syncope) presents with 8 days of lightheadedness, loss of appetite following 1 day of significant diarrhea. No spinning. Denies and chronic GI symptoms or presence of blood, has not had diarrhea since initial episode but has lost 12 pounds in the past week, without vomiting or nausea. Most likely the patient's symptoms are secondary to dehydration secondary to episode of infectious diarrhea and loss of appetite, fluids were initiated in the ED, orthostatics do not meet diagnostic criteria for orthostatic hypotension.     Patient did have recent abx use after a dental procedure this month, although C. difficile infection is unlikely due minimal diarrhea, no blood per rectum and absence of systemic symptoms and abdominal pain. Patient does have 1st degree AV block but no bradycardia or arrhythmias while in the ED to explain symptoms. Stroke unlikely due to normal neurologic exam, malignancy unlikely due to rapid onset of weight loss and no B symptoms.

## 2021-07-07 NOTE — ED PROVIDER NOTE - BIRTH SEX
Detail Level: Detailed Quality 130: Documentation Of Current Medications In The Medical Record: Current Medications Documented Male

## 2021-07-07 NOTE — ED PROVIDER NOTE - NSFOLLOWUPINSTRUCTIONS_ED_ALL_ED_FT
- Lab and imaging results, if performed, were discussed with you along with your discharge diagnosis    - Follow up with your doctor in 1 week - bring copies of your results if you were given. If you do not have a primary doctor, please call [phone] to find one convenient for you    - Return to the ED for any new, worsening, or concerning symptoms to you including worsening fever, chills, abdominal pain, vomiting, inability to tolerate eating or drinking     - Continue all prescribed medications    - Rest and keep yourself hydrated with fluids

## 2021-07-08 LAB
APPEARANCE UR: ABNORMAL
BACTERIA # UR AUTO: ABNORMAL
BILIRUB UR-MCNC: NEGATIVE — SIGNIFICANT CHANGE UP
COLOR SPEC: YELLOW — SIGNIFICANT CHANGE UP
DIFF PNL FLD: ABNORMAL
EPI CELLS # UR: SIGNIFICANT CHANGE UP /HPF (ref 0–5)
GLUCOSE UR QL: NEGATIVE — SIGNIFICANT CHANGE UP
HYALINE CASTS # UR AUTO: SIGNIFICANT CHANGE UP /LPF (ref 0–7)
KETONES UR-MCNC: ABNORMAL
LEUKOCYTE ESTERASE UR-ACNC: ABNORMAL
NITRITE UR-MCNC: NEGATIVE — SIGNIFICANT CHANGE UP
PH UR: 6 — SIGNIFICANT CHANGE UP (ref 5–8)
PROT UR-MCNC: ABNORMAL
RBC CASTS # UR COMP ASSIST: SIGNIFICANT CHANGE UP /HPF (ref 0–4)
SP GR SPEC: 1.03 — HIGH (ref 1.01–1.02)
TROPONIN T, HIGH SENSITIVITY RESULT: 52 NG/L — HIGH
UROBILINOGEN FLD QL: SIGNIFICANT CHANGE UP
WBC UR QL: SIGNIFICANT CHANGE UP /HPF (ref 0–5)

## 2021-07-09 LAB
CULTURE RESULTS: SIGNIFICANT CHANGE UP
SPECIMEN SOURCE: SIGNIFICANT CHANGE UP

## 2021-07-23 ENCOUNTER — APPOINTMENT (OUTPATIENT)
Dept: CT IMAGING | Facility: HOSPITAL | Age: 86
End: 2021-07-23

## 2021-07-23 ENCOUNTER — OUTPATIENT (OUTPATIENT)
Dept: OUTPATIENT SERVICES | Facility: HOSPITAL | Age: 86
LOS: 1 days | End: 2021-07-23
Payer: COMMERCIAL

## 2021-07-23 DIAGNOSIS — Z00.00 ENCOUNTER FOR GENERAL ADULT MEDICAL EXAMINATION WITHOUT ABNORMAL FINDINGS: ICD-10-CM

## 2021-07-23 DIAGNOSIS — R10.31 RIGHT LOWER QUADRANT PAIN: ICD-10-CM

## 2021-07-23 PROCEDURE — 74176 CT ABD & PELVIS W/O CONTRAST: CPT | Mod: 26

## 2021-07-23 PROCEDURE — 74176 CT ABD & PELVIS W/O CONTRAST: CPT

## 2021-07-26 ENCOUNTER — EMERGENCY (EMERGENCY)
Facility: HOSPITAL | Age: 86
LOS: 1 days | Discharge: ANOTHER HOSPITAL | End: 2021-07-26
Attending: EMERGENCY MEDICINE | Admitting: EMERGENCY MEDICINE
Payer: MEDICARE

## 2021-07-26 ENCOUNTER — INPATIENT (INPATIENT)
Facility: HOSPITAL | Age: 86
LOS: 1 days | Discharge: NURSING, REHAB OR LONG-TERM CARE | DRG: 300 | End: 2021-07-28
Attending: THORACIC SURGERY (CARDIOTHORACIC VASCULAR SURGERY) | Admitting: THORACIC SURGERY (CARDIOTHORACIC VASCULAR SURGERY)
Payer: COMMERCIAL

## 2021-07-26 VITALS
OXYGEN SATURATION: 100 % | SYSTOLIC BLOOD PRESSURE: 134 MMHG | TEMPERATURE: 98 F | HEIGHT: 74 IN | DIASTOLIC BLOOD PRESSURE: 106 MMHG | RESPIRATION RATE: 20 BRPM | HEART RATE: 55 BPM

## 2021-07-26 VITALS
RESPIRATION RATE: 29 BRPM | HEART RATE: 63 BPM | DIASTOLIC BLOOD PRESSURE: 84 MMHG | WEIGHT: 236.56 LBS | SYSTOLIC BLOOD PRESSURE: 178 MMHG | OXYGEN SATURATION: 100 % | HEIGHT: 73 IN

## 2021-07-26 VITALS
RESPIRATION RATE: 20 BRPM | DIASTOLIC BLOOD PRESSURE: 73 MMHG | SYSTOLIC BLOOD PRESSURE: 141 MMHG | OXYGEN SATURATION: 100 % | HEART RATE: 58 BPM

## 2021-07-26 DIAGNOSIS — I72.9 ANEURYSM OF UNSPECIFIED SITE: ICD-10-CM

## 2021-07-26 LAB
ALBUMIN SERPL ELPH-MCNC: 3.9 G/DL — SIGNIFICANT CHANGE UP (ref 3.3–5)
ALP SERPL-CCNC: 68 U/L — SIGNIFICANT CHANGE UP (ref 40–120)
ALT FLD-CCNC: 20 U/L — SIGNIFICANT CHANGE UP (ref 4–41)
ANION GAP SERPL CALC-SCNC: 9 MMOL/L — SIGNIFICANT CHANGE UP (ref 7–14)
APTT BLD: 53.3 SEC — HIGH (ref 27–36.3)
AST SERPL-CCNC: 19 U/L — SIGNIFICANT CHANGE UP (ref 4–40)
BASOPHILS # BLD AUTO: 0 K/UL — SIGNIFICANT CHANGE UP (ref 0–0.2)
BASOPHILS # BLD AUTO: 0.01 K/UL — SIGNIFICANT CHANGE UP (ref 0–0.2)
BASOPHILS NFR BLD AUTO: 0 % — SIGNIFICANT CHANGE UP (ref 0–2)
BASOPHILS NFR BLD AUTO: 0.2 % — SIGNIFICANT CHANGE UP (ref 0–2)
BILIRUB SERPL-MCNC: 0.4 MG/DL — SIGNIFICANT CHANGE UP (ref 0.2–1.2)
BLD GP AB SCN SERPL QL: NEGATIVE — SIGNIFICANT CHANGE UP
BLOOD GAS VENOUS COMPREHENSIVE RESULT: SIGNIFICANT CHANGE UP
BUN SERPL-MCNC: 36 MG/DL — HIGH (ref 7–23)
CALCIUM SERPL-MCNC: 9.3 MG/DL — SIGNIFICANT CHANGE UP (ref 8.4–10.5)
CHLORIDE SERPL-SCNC: 114 MMOL/L — HIGH (ref 98–107)
CO2 SERPL-SCNC: 23 MMOL/L — SIGNIFICANT CHANGE UP (ref 22–31)
CREAT SERPL-MCNC: 1.55 MG/DL — HIGH (ref 0.5–1.3)
EOSINOPHIL # BLD AUTO: 0.12 K/UL — SIGNIFICANT CHANGE UP (ref 0–0.5)
EOSINOPHIL # BLD AUTO: 0.19 K/UL — SIGNIFICANT CHANGE UP (ref 0–0.5)
EOSINOPHIL NFR BLD AUTO: 2.6 % — SIGNIFICANT CHANGE UP (ref 0–6)
EOSINOPHIL NFR BLD AUTO: 3.6 % — SIGNIFICANT CHANGE UP (ref 0–6)
GAS PNL BLDA: SIGNIFICANT CHANGE UP
GLUCOSE SERPL-MCNC: 104 MG/DL — HIGH (ref 70–99)
HCT VFR BLD CALC: 39.2 % — SIGNIFICANT CHANGE UP (ref 39–50)
HCT VFR BLD CALC: 42.5 % — SIGNIFICANT CHANGE UP (ref 39–50)
HGB BLD-MCNC: 12.1 G/DL — LOW (ref 13–17)
HGB BLD-MCNC: 13.4 G/DL — SIGNIFICANT CHANGE UP (ref 13–17)
IANC: 3.24 K/UL — SIGNIFICANT CHANGE UP (ref 1.5–8.5)
IMM GRANULOCYTES NFR BLD AUTO: 0.4 % — SIGNIFICANT CHANGE UP (ref 0–1.5)
INR BLD: 1.94 RATIO — HIGH (ref 0.88–1.16)
LIDOCAIN IGE QN: 24 U/L — SIGNIFICANT CHANGE UP (ref 7–60)
LYMPHOCYTES # BLD AUTO: 0.28 K/UL — LOW (ref 1–3.3)
LYMPHOCYTES # BLD AUTO: 1.06 K/UL — SIGNIFICANT CHANGE UP (ref 1–3.3)
LYMPHOCYTES # BLD AUTO: 20.3 % — SIGNIFICANT CHANGE UP (ref 13–44)
LYMPHOCYTES # BLD AUTO: 6.1 % — LOW (ref 13–44)
MCHC RBC-ENTMCNC: 29 PG — SIGNIFICANT CHANGE UP (ref 27–34)
MCHC RBC-ENTMCNC: 29.5 PG — SIGNIFICANT CHANGE UP (ref 27–34)
MCHC RBC-ENTMCNC: 30.9 GM/DL — LOW (ref 32–36)
MCHC RBC-ENTMCNC: 31.5 GM/DL — LOW (ref 32–36)
MCV RBC AUTO: 93.4 FL — SIGNIFICANT CHANGE UP (ref 80–100)
MCV RBC AUTO: 94 FL — SIGNIFICANT CHANGE UP (ref 80–100)
MONOCYTES # BLD AUTO: 0.36 K/UL — SIGNIFICANT CHANGE UP (ref 0–0.9)
MONOCYTES # BLD AUTO: 0.41 K/UL — SIGNIFICANT CHANGE UP (ref 0–0.9)
MONOCYTES NFR BLD AUTO: 6.9 % — SIGNIFICANT CHANGE UP (ref 2–14)
MONOCYTES NFR BLD AUTO: 8.7 % — SIGNIFICANT CHANGE UP (ref 2–14)
NEUTROPHILS # BLD AUTO: 3.57 K/UL — SIGNIFICANT CHANGE UP (ref 1.8–7.4)
NEUTROPHILS # BLD AUTO: 3.65 K/UL — SIGNIFICANT CHANGE UP (ref 1.8–7.4)
NEUTROPHILS NFR BLD AUTO: 68.6 % — SIGNIFICANT CHANGE UP (ref 43–77)
NEUTROPHILS NFR BLD AUTO: 78.3 % — HIGH (ref 43–77)
NRBC # BLD: 0 /100 WBCS — SIGNIFICANT CHANGE UP (ref 0–0)
PLATELET # BLD AUTO: 88 K/UL — LOW (ref 150–400)
PLATELET # BLD AUTO: 88 K/UL — LOW (ref 150–400)
POTASSIUM SERPL-MCNC: 4.5 MMOL/L — SIGNIFICANT CHANGE UP (ref 3.5–5.3)
POTASSIUM SERPL-SCNC: 4.5 MMOL/L — SIGNIFICANT CHANGE UP (ref 3.5–5.3)
PROT SERPL-MCNC: 6 G/DL — SIGNIFICANT CHANGE UP (ref 6–8.3)
PROTHROM AB SERPL-ACNC: 21.6 SEC — HIGH (ref 10.6–13.6)
RBC # BLD: 4.17 M/UL — LOW (ref 4.2–5.8)
RBC # BLD: 4.55 M/UL — SIGNIFICANT CHANGE UP (ref 4.2–5.8)
RBC # FLD: 14.7 % — HIGH (ref 10.3–14.5)
RBC # FLD: 14.8 % — HIGH (ref 10.3–14.5)
RH IG SCN BLD-IMP: POSITIVE — SIGNIFICANT CHANGE UP
SODIUM SERPL-SCNC: 146 MMOL/L — HIGH (ref 135–145)
WBC # BLD: 4.66 K/UL — SIGNIFICANT CHANGE UP (ref 3.8–10.5)
WBC # BLD: 5.21 K/UL — SIGNIFICANT CHANGE UP (ref 3.8–10.5)
WBC # FLD AUTO: 4.66 K/UL — SIGNIFICANT CHANGE UP (ref 3.8–10.5)
WBC # FLD AUTO: 5.21 K/UL — SIGNIFICANT CHANGE UP (ref 3.8–10.5)

## 2021-07-26 PROCEDURE — 71275 CT ANGIOGRAPHY CHEST: CPT | Mod: 26

## 2021-07-26 PROCEDURE — 36000 PLACE NEEDLE IN VEIN: CPT

## 2021-07-26 PROCEDURE — 99222 1ST HOSP IP/OBS MODERATE 55: CPT | Mod: 25

## 2021-07-26 PROCEDURE — 36620 INSERTION CATHETER ARTERY: CPT

## 2021-07-26 PROCEDURE — 74174 CTA ABD&PLVS W/CONTRAST: CPT | Mod: 26

## 2021-07-26 PROCEDURE — 99285 EMERGENCY DEPT VISIT HI MDM: CPT | Mod: 25

## 2021-07-26 PROCEDURE — 99291 CRITICAL CARE FIRST HOUR: CPT

## 2021-07-26 RX ORDER — INSULIN GLARGINE 100 [IU]/ML
32 INJECTION, SOLUTION SUBCUTANEOUS ONCE
Refills: 0 | Status: DISCONTINUED | OUTPATIENT
Start: 2021-07-26 | End: 2021-07-26

## 2021-07-26 RX ORDER — LABETALOL HCL 100 MG
10 TABLET ORAL ONCE
Refills: 0 | Status: COMPLETED | OUTPATIENT
Start: 2021-07-26 | End: 2021-07-26

## 2021-07-26 RX ORDER — LABETALOL HCL 100 MG
10 TABLET ORAL ONCE
Refills: 0 | Status: DISCONTINUED | OUTPATIENT
Start: 2021-07-26 | End: 2021-07-26

## 2021-07-26 RX ORDER — SODIUM CHLORIDE 9 MG/ML
500 INJECTION, SOLUTION INTRAVENOUS ONCE
Refills: 0 | Status: COMPLETED | OUTPATIENT
Start: 2021-07-26 | End: 2021-07-26

## 2021-07-26 RX ADMIN — SODIUM CHLORIDE 500 MILLILITER(S): 9 INJECTION, SOLUTION INTRAVENOUS at 16:33

## 2021-07-26 RX ADMIN — Medication 10 MILLIGRAM(S): at 21:39

## 2021-07-26 NOTE — PROGRESS NOTE ADULT - SUBJECTIVE AND OBJECTIVE BOX
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 Patient has occasional back pain and lower abdominal discomfort recently related to diarrhea. Patient has had nausea and loss of appetite recently with poor po intake and 12 pound weight loss. Work up with CT scan reveled the aortic findings mentioned. Patient is relatively healthy with h/o GB removal, TURP, ambulates with a walker, limited activity due to spinal stenosis, h/o PPM, and DVT many years ago s/p IVC filter.     OBJECTIVE:  ICU Vital Signs Last 24 Hrs  T(C): 36.7 (26 Jul 2021 18:51), Max: 36.7 (26 Jul 2021 13:07)  T(F): 98 (26 Jul 2021 18:51), Max: 98 (26 Jul 2021 13:07)  HR: 63 (26 Jul 2021 23:02) (55 - 63)  BP: 178/84 (26 Jul 2021 23:02) (134/106 - 178/84)  BP(mean): 121 (26 Jul 2021 23:02) (115 - 121)  ABP: --  ABP(mean): --  RR: 29 (26 Jul 2021 23:02) (16 - 29)  SpO2: 100% (26 Jul 2021 23:02) (98% - 100%)        PHYSICAL EXAM:Daily Height in cm: 185.42 (26 Jul 2021 23:02)       General: WN/WD NAD    HEENT:     + NCAT  + EOMI  - Conjuctival edema  Neck:         + FROM    - JVD     - Nodes     - Masses    + Mid-line trachea    Lungs:          + CTA   - Rhonchi    - Rales    - Wheezing   Cardiac:       + S1 + S2    + RRR   - S3  - S4    - Murmurs   - Rub   Abdomen:    + BS     + Soft    + Non-tender     - Distended    - Organomegaly  Extremities:   - Cyanosis U/L   - Clubbing  U/L  +2 LE Edema   + Capillary refill    + Pulses   Neuro:        + Awake   +  Alert   - Confused - Generalized Weakness  Skin:        - Rashes    - Erythema   + darkened skin over bilateral lower extremities c/w venous stasis    LABS: All Lab data reviewed and analyzed                        12.1 4.66  )-----------( 88       ( 26 Jul 2021 16:38 )             39.2    07-26    146<H>  |  114<H>  |  36<H>  ----------------------------<  104<H>  4.5   |  23  |  1.55<H>    Ca    9.3      26 Jul 2021 16:38    TPro  6.0  /  Alb  3.9  /  TBili  0.4  /  DBili  x   /  AST  19  /  ALT  20  /  AlkPhos  68  07-26    PT/INR - ( 26 Jul 2021 16:38 )   PT: 21.6 sec;   INR: 1.94 ratio         PTT - ( 26 Jul 2021 16:38 )  PTT:53.3 sec LIVER FUNCTIONS - ( 26 Jul 2021 16:38 )  Alb: 3.9 g/dL / Pro: 6.0 g/dL / ALK PHOS: 68 U/L / ALT: 20 U/L / AST: 19 U/L / GGT: x           RADIOLOGY: - Reviewed and analyzed     Descending aortic aneurysm with possible impending rupture. Arterial line to be placed. IV Labetalol ordered with plan for continuous monitoring and titration of Labetalol.  Vascular consult appreciated. Patient may be a candidate for stent placement.  Respiratory status required supplemental oxygen, close monitoring of breathing pattern and respiratory rate & the following of continuous pulse oximetry for support & to prevent decompensation  Lipitor was also continued for long term graft patency  Lovenox/Heparin initiated/continued for VTE prophylaxis in addition to sequential compression devices  Protonix maintained for GI bleeding prophylaxis  Lopressor initiated/continued for atrial fibrillation prophylaxis  Metabolic stability & infection prophylaxis required review and adjustment of regular Insulin sliding scale and glycemic regimen while following serial glucose levels to help achieve & maintain euglycemia  Reviewed & addressed surgical site infection prophylaxis regimen   HORTENCIA RICO  MRN#: [id number]    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 Patient has occasional back pain and lower abdominal discomfort recently related to diarrhea. Patient has had nausea and loss of appetite recently with poor po intake and 12 pound weight loss. Work up with CT scan reveled the aortic findings mentioned. Patient is relatively healthy with h/o GB removal, TURP, ambulates with a walker, limited activity due to spinal stenosis, h/o PPM, and DVT many years ago s/p IVC filter.     Outpatient meds:    Gabapentin  Coumadin  Primidone  Lipitor  Zyrtec  Topamax    OBJECTIVE:  ICU Vital Signs Last 24 Hrs  T(C): 36.7 (26 Jul 2021 18:51), Max: 36.7 (26 Jul 2021 13:07)  T(F): 98 (26 Jul 2021 18:51), Max: 98 (26 Jul 2021 13:07)  HR: 63 (26 Jul 2021 23:02) (55 - 63)  BP: 178/84 (26 Jul 2021 23:02) (134/106 - 178/84)  BP(mean): 121 (26 Jul 2021 23:02) (115 - 121)  ABP: --  ABP(mean): --  RR: 29 (26 Jul 2021 23:02) (16 - 29)  SpO2: 100% (26 Jul 2021 23:02) (98% - 100%)    PHYSICAL EXAM:Daily Height in cm: 185.42 (26 Jul 2021 23:02)       General: WN/WD NAD    HEENT:     + NCAT  + EOMI  - Conjuctival edema  Neck:         + FROM    - JVD     - Nodes     - Masses    + Mid-line trachea    Lungs:          + CTA   - Rhonchi    - Rales    - Wheezing   Cardiac:       + S1 + S2    + RRR   - S3  - S4    - Murmurs   - Rub   Abdomen:    + BS     + Soft    + Non-tender     - Distended    - Organomegaly  Extremities:   - Cyanosis U/L   - Clubbing  U/L  +2 LE Edema   + Capillary refill    + Pulses   Neuro:        + Awake   +  Alert   - Confused - Generalized Weakness  Skin:        - Rashes    - Erythema   + darkened skin over bilateral lower extremities c/w venous stasis    LABS: All Lab data reviewed and analyzed                        12.1   4.66  )-----------( 88       ( 26 Jul 2021 16:38 )             39.2    07-26    146<H>  |  114<H>  |  36<H>  ----------------------------<  104<H>  4.5   |  23  |  1.55<H>    Ca    9.3      26 Jul 2021 16:38    TPro  6.0  /  Alb  3.9  /  TBili  0.4  /  DBili  x   /  AST  19  /  ALT  20  /  AlkPhos  68  07-26    PT/INR - ( 26 Jul 2021 16:38 )   PT: 21.6 sec;   INR: 1.94 ratio         PTT - ( 26 Jul 2021 16:38 )  PTT:53.3 sec LIVER FUNCTIONS - ( 26 Jul 2021 16:38 )  Alb: 3.9 g/dL / Pro: 6.0 g/dL / ALK PHOS: 68 U/L / ALT: 20 U/L / AST: 19 U/L / GGT: x           RADIOLOGY: - Reviewed and analyzed     Descending aortic aneurysm with possible impending rupture. Arterial line to be placed. IV Labetalol ordered with plan for continuous monitoring and titration of Labetalol. Continue Lipitor. Hold Coumadin  Vascular consult appreciated. Patient may be a candidate for stent placement.  Respiratory status required supplemental oxygen, close monitoring of breathing pattern and respiratory rate & the following of continuous pulse oximetry for support & to prevent decompensation  S/p IVC filter for h/o DVT  Protonix maintained for GI bleeding prophylaxis  Continue Gabapentin and Primidone.     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 Patient has occasional back pain and lower abdominal discomfort recently related to diarrhea. Patient has had nausea and loss of appetite recently with poor po intake and 12 pound weight loss. Work up with CT scan reveled the aortic findings mentioned. Patient is relatively healthy with h/o GB removal, TURP, ambulates with a walker, limited activity due to spinal stenosis, h/o PPM, and DVT many years ago s/p IVC filter.     Outpatient meds:    Gabapentin  Coumadin  Primidone  Lipitor  Zyrtec  Topamax    OBJECTIVE:  ICU Vital Signs Last 24 Hrs  T(C): 36.7 (26 Jul 2021 18:51), Max: 36.7 (26 Jul 2021 13:07)  T(F): 98 (26 Jul 2021 18:51), Max: 98 (26 Jul 2021 13:07)  HR: 63 (26 Jul 2021 23:02) (55 - 63)  BP: 178/84 (26 Jul 2021 23:02) (134/106 - 178/84)  BP(mean): 121 (26 Jul 2021 23:02) (115 - 121)  ABP: --  ABP(mean): --  RR: 29 (26 Jul 2021 23:02) (16 - 29)  SpO2: 100% (26 Jul 2021 23:02) (98% - 100%)    PHYSICAL EXAM:Daily Height in cm: 185.42 (26 Jul 2021 23:02)       General: WN/WD NAD    HEENT:     + NCAT  + EOMI  - Conjuctival edema  Neck:         + FROM    - JVD     - Nodes     - Masses    + Mid-line trachea    Lungs:          + CTA   - Rhonchi    - Rales    - Wheezing   Cardiac:       + S1 + S2    + RRR   - S3  - S4    - Murmurs   - Rub   Abdomen:    + BS     + Soft    + Non-tender     - Distended    - Organomegaly  Extremities:   - Cyanosis U/L   - Clubbing  U/L  +2 LE Edema   + Capillary refill    + Pulses   Neuro:        + Awake   +  Alert   - Confused - Generalized Weakness  Skin:        - Rashes    - Erythema   + darkened skin over bilateral lower extremities c/w venous stasis    LABS: All Lab data reviewed and analyzed                        12.1   4.66  )-----------( 88       ( 26 Jul 2021 16:38 )             39.2    07-26    146<H>  |  114<H>  |  36<H>  ----------------------------<  104<H>  4.5   |  23  |  1.55<H>    Ca    9.3      26 Jul 2021 16:38    TPro  6.0  /  Alb  3.9  /  TBili  0.4  /  DBili  x   /  AST  19  /  ALT  20  /  AlkPhos  68  07-26    PT/INR - ( 26 Jul 2021 16:38 )   PT: 21.6 sec;   INR: 1.94 ratio         PTT - ( 26 Jul 2021 16:38 )  PTT:53.3 sec LIVER FUNCTIONS - ( 26 Jul 2021 16:38 )  Alb: 3.9 g/dL / Pro: 6.0 g/dL / ALK PHOS: 68 U/L / ALT: 20 U/L / AST: 19 U/L / GGT: x           RADIOLOGY: - Reviewed and analyzed     Descending aortic aneurysm with possible impending rupture. Arterial line to be placed. IV Labetalol ordered with plan for continuous monitoring and titration of Labetalol. Continue Lipitor. Hold Coumadin  Vascular consult appreciated. Patient may be a candidate for stent placement.  Respiratory status required supplemental oxygen, close monitoring of breathing pattern and respiratory rate & the following of continuous pulse oximetry for support & to prevent decompensation  S/p IVC filter for h/o DVT  Protonix maintained for GI bleeding prophylaxis  Continue Gabapentin and Primidone.    Patient requires continuous monitoring with EKG, arterial line, pulse oximetry, close monitoring of breathing pattern and intermittent blood gas analysis in order to prevent or respond to cardiopulmonary instability or decompensation.    Care plan discussed with ICU care team. I have spent 35 minutes providing non routine post op care for this critically ill patient.     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 Patient has occasional back pain and lower abdominal discomfort recently related to diarrhea. Patient has had nausea and loss of appetite recently with poor po intake and 12 pound weight loss. Work up with CT scan reveled the aortic findings mentioned. Patient is relatively healthy with h/o GB removal, TURP, ambulates with a walker, limited activity due to spinal stenosis, h/o PPM, and DVT many years ago s/p IVC filter.     Outpatient meds:    Gabapentin  Coumadin  Primidone  Lipitor  Zyrtec  Topamax    OBJECTIVE:  ICU Vital Signs Last 24 Hrs  T(C): 36.7 (26 Jul 2021 18:51), Max: 36.7 (26 Jul 2021 13:07)  T(F): 98 (26 Jul 2021 18:51), Max: 98 (26 Jul 2021 13:07)  HR: 63 (26 Jul 2021 23:02) (55 - 63)  BP: 178/84 (26 Jul 2021 23:02) (134/106 - 178/84)  BP(mean): 121 (26 Jul 2021 23:02) (115 - 121)  ABP: --  ABP(mean): --  RR: 29 (26 Jul 2021 23:02) (16 - 29)  SpO2: 100% (26 Jul 2021 23:02) (98% - 100%)    PHYSICAL EXAM:Daily Height in cm: 185.42 (26 Jul 2021 23:02)       General: WN/WD NAD    HEENT:     + NCAT  + EOMI  - Conjuctival edema  Neck:         + FROM    - JVD     - Nodes     - Masses    + Mid-line trachea    Lungs:          + CTA   - Rhonchi    - Rales    - Wheezing   Cardiac:       + S1 + S2    + RRR   - S3  - S4    - Murmurs   - Rub   Abdomen:    + BS     + Soft    + Non-tender     - Distended    - Organomegaly  Extremities:   - Cyanosis U/L   - Clubbing  U/L  +2 LE Edema   + Capillary refill    + Pulses   Neuro:        + Awake   +  Alert   - Confused - Generalized Weakness  Skin:        - Rashes    - Erythema   + darkened skin over bilateral lower extremities c/w venous stasis    LABS: All Lab data reviewed and analyzed                        12.1   4.66  )-----------( 88       ( 26 Jul 2021 16:38 )             39.2    07-26    146<H>  |  114<H>  |  36<H>  ----------------------------<  104<H>  4.5   |  23  |  1.55<H>    Ca    9.3      26 Jul 2021 16:38    TPro  6.0  /  Alb  3.9  /  TBili  0.4  /  DBili  x   /  AST  19  /  ALT  20  /  AlkPhos  68  07-26    PT/INR - ( 26 Jul 2021 16:38 )   PT: 21.6 sec;   INR: 1.94 ratio         PTT - ( 26 Jul 2021 16:38 )  PTT:53.3 sec LIVER FUNCTIONS - ( 26 Jul 2021 16:38 )  Alb: 3.9 g/dL / Pro: 6.0 g/dL / ALK PHOS: 68 U/L / ALT: 20 U/L / AST: 19 U/L / GGT: x           RADIOLOGY: - Reviewed and analyzed     Descending aortic aneurysm with possible impending rupture. Arterial line to be placed. IV Labetalol ordered with plan for continuous monitoring and titration of Labetalol. Continue Lipitor. Hold Coumadin  Vascular consult appreciated. Patient may be a candidate for stent placement.  Respiratory status required supplemental oxygen, close monitoring of breathing pattern and respiratory rate & the following of continuous pulse oximetry for support & to prevent decompensation  S/p IVC filter for h/o DVT  Thrombocytopenia, possibly consumptive, no current indication for transfusion  Protonix maintained for GI bleeding prophylaxis  Continue Gabapentin and Primidone.    Patient requires continuous monitoring with EKG, arterial line, pulse oximetry, close monitoring of breathing pattern and intermittent blood gas analysis in order to prevent or respond to cardiopulmonary instability or decompensation.    Care plan discussed with ICU care team. I have spent 35 minutes providing non routine post op care for this critically ill patient.

## 2021-07-26 NOTE — CONSULT NOTE ADULT - SUBJECTIVE AND OBJECTIVE BOX
Vascular Surgery Consult Note  Attending: Dr. Santiago Alexander  Service: C Team Surgery  [id number]    HPI: 89M w/ PMHx of DVT w/ IVC filter placed in 2005, stage III CKD, PAD, PPM who presen.  Pt was seen at [facility] in early July for diarrhea.  Labs significant for worsening MILAGROS, but pt. did not want to wait for CT.  Was discharged w/ return precautions and PCP f/u.  Had a CT on 7/23 and got a call 1.5 hours ago stating he has an aneurysm 7.7cm in the lower thoracic region.  Pt. denies any acute abd pain, lightheadedness, CP, SOB, diarrhea.  Pt. still on coumadin for pacemaker.      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  H/O  TURP (Transurethral Resection of Prostate)  S/P tonsillectomy  in 1932    ALLERGIES:  Lovenox (Hives; Flushing; Rash; Chills)    SOCIAL HISTORY:  Former smoker, quit 62 years ago  No alcohol intake    FAMILY HISTORY:  No pertinent family history in first degree relatives    PHYSICAL EXAM:  General: NAD, resting comfortably  HEENT: NC/AT, EOMI, normal hearing, no oral lesions, no LAD, neck supple  Pulmonary: normal resp effort, CTA-B  Cardiovascular: NSR, no murmurs  Abdominal: soft, ND/NT, no organomegaly  Extremities: WWP, normal strength, no clubbing/cyanosis/edema  Pulses: palpable distal pulses    VITAL SIGNS:  Vital Signs Last 24 Hrs  T(C): 36.7 (26 Jul 2021 18:51), Max: 36.7 (26 Jul 2021 13:07)  T(F): 98 (26 Jul 2021 18:51), Max: 98 (26 Jul 2021 13:07)  HR: 60 (26 Jul 2021 18:51) (55 - 60)  BP: 172/105 (26 Jul 2021 18:51) (134/106 - 172/105)  BP(mean): --  RR: 18 (26 Jul 2021 18:51) (16 - 20)  SpO2: 100% (26 Jul 2021 18:51) (98% - 100%)    I&O's Summary    LABS:                        12.1   4.66  )-----------( 88       ( 26 Jul 2021 16:38 )             39.2     07-26    146<H>  |  114<H>  |  36<H>  ----------------------------<  104<H>  4.5   |  23  |  1.55<H>    Ca    9.3      26 Jul 2021 16:38    TPro  6.0  /  Alb  3.9  /  TBili  0.4  /  DBili  x   /  AST  19  /  ALT  20  /  AlkPhos  68  07-26    PT/INR - ( 26 Jul 2021 16:38 )   PT: 21.6 sec;   INR: 1.94 ratio         PTT - ( 26 Jul 2021 16:38 )  PTT:53.3 sec    CAPILLARY BLOOD GLUCOSE    LIVER FUNCTIONS - ( 26 Jul 2021 16:38 )  Alb: 3.9 g/dL / Pro: 6.0 g/dL / ALK PHOS: 68 U/L / ALT: 20 U/L / AST: 19 U/L / GGT: x           RADIOLOGY & ADDITIONAL STUDIES:    CT Abdomen and Pelvis w/ Oral Cont (07.23.21 @ 12:49)    FINDINGS:  LOWER CHEST: Aneurysmal dilatation of the visualized portion of the lower thoracic aorta which measures up to 7.7 cm. The wall is somewhat irregular at noncontrast imaging with peripheral high density inferiorly and heterogeneous density at the superior margin. No evidence of stranding in the fat of the posterior mediastinum. Findings suggest this may represent an acute to subacute thoracic aortic injury. Small left pleural effusion. Mild bibasilar reticulation.Cardiac device lead partially imaged.    LIVER: A few subcentimeter hypodense hepatic foci, too small to characterize.  BILE DUCTS: Normal caliber.  GALLBLADDER: Within normal limits.  SPLEEN: Within normal limits.  PANCREAS: Within normal limits.  ADRENALS: Within normal limits.  KIDNEYS/URETERS: Within normal limits.    BLADDER: Within normal limits.  REPRODUCTIVE ORGANS: Prostate is enlarged.    BOWEL: No bowel obstruction. Appendix is not visualized. No evidence of inflammation in the pericecal region.  PERITONEUM: No ascites.  VESSELS: Atherosclerotic changes. A few saccular aneurysms of the infrarenal abdominal aorta measuring upto 1.6 cm. IVC filter.  RETROPERITONEUM/LYMPH NODES: No lymphadenopathy.  ABDOMINAL WALL: Within normal limits.  BONES: Degenerative changes.    IMPRESSION:  Aneurysmal dilatation of the thoracic aorta as above suggesting an acute to subacute thoracic aortic injury. Correlation with emergent pre and postcontrast CT of the chest is recommended. Vascular Surgery Consult Note  Attending: Dr. Santiago Alexander  Service: C Team Surgery  [id number]    HPI: 89M w/ PMHx of DVT w/ IVC filter placed in 2005, stage III CKD, PAD, PPM who presents to the ED after outside CT revealed a thoracic aortic aneurysm. Pt was seen at [facility] in early July for diarrhea.  Labs significant for worsening MILAGROS, but pt. did not want to wait for CT.  Was discharged w/ return precautions and PCP f/u.  Had a CT on 7/23 and got a call stating he has an aneurysm 7.7cm in the lower thoracic region.     In the ED, patient was afebrile, hemodynamically stable, labs remarkable for known CKD w/ elevated Cr of 1.86. Vacular surgery consulted for evaluation.    Patient's last colonoscopy was 5 years ago and reportedly was normal.    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  H/O  TURP (Transurethral Resection of Prostate)  S/P tonsillectomy  in 1932    ALLERGIES:  Lovenox (Hives; Flushing; Rash; Chills)    SOCIAL HISTORY:  Former smoker, quit 62 years ago  No alcohol intake    FAMILY HISTORY:  No pertinent family history in first degree relatives    PHYSICAL EXAM:  General: NAD, resting comfortably  HEENT: NC/AT, EOMI, normal hearing, no oral lesions, no LAD, neck supple  Pulmonary: normal resp effort, CTA-B  Cardiovascular: NSR, no murmurs  Abdominal: soft, ND/NT, no organomegaly  Extremities: WWP, normal strength, no clubbing/cyanosis/edema  Pulses: palpable distal pulses    VITAL SIGNS:  Vital Signs Last 24 Hrs  T(C): 36.7 (26 Jul 2021 18:51), Max: 36.7 (26 Jul 2021 13:07)  T(F): 98 (26 Jul 2021 18:51), Max: 98 (26 Jul 2021 13:07)  HR: 60 (26 Jul 2021 18:51) (55 - 60)  BP: 172/105 (26 Jul 2021 18:51) (134/106 - 172/105)  BP(mean): --  RR: 18 (26 Jul 2021 18:51) (16 - 20)  SpO2: 100% (26 Jul 2021 18:51) (98% - 100%)    I&O's Summary    LABS:                        12.1   4.66  )-----------( 88       ( 26 Jul 2021 16:38 )             39.2     07-26    146<H>  |  114<H>  |  36<H>  ----------------------------<  104<H>  4.5   |  23  |  1.55<H>    Ca    9.3      26 Jul 2021 16:38    TPro  6.0  /  Alb  3.9  /  TBili  0.4  /  DBili  x   /  AST  19  /  ALT  20  /  AlkPhos  68  07-26    PT/INR - ( 26 Jul 2021 16:38 )   PT: 21.6 sec;   INR: 1.94 ratio         PTT - ( 26 Jul 2021 16:38 )  PTT:53.3 sec    CAPILLARY BLOOD GLUCOSE    LIVER FUNCTIONS - ( 26 Jul 2021 16:38 )  Alb: 3.9 g/dL / Pro: 6.0 g/dL / ALK PHOS: 68 U/L / ALT: 20 U/L / AST: 19 U/L / GGT: x           RADIOLOGY & ADDITIONAL STUDIES:    CT Abdomen and Pelvis w/ Oral Cont (07.23.21 @ 12:49)    FINDINGS:  LOWER CHEST: Aneurysmal dilatation of the visualized portion of the lower thoracic aorta which measures up to 7.7 cm. The wall is somewhat irregular at noncontrast imaging with peripheral high density inferiorly and heterogeneous density at the superior margin. No evidence of stranding in the fat of the posterior mediastinum. Findings suggest this may represent an acute to subacute thoracic aortic injury. Small left pleural effusion. Mild bibasilar reticulation.Cardiac device lead partially imaged.    LIVER: A few subcentimeter hypodense hepatic foci, too small to characterize.  BILE DUCTS: Normal caliber.  GALLBLADDER: Within normal limits.  SPLEEN: Within normal limits.  PANCREAS: Within normal limits.  ADRENALS: Within normal limits.  KIDNEYS/URETERS: Within normal limits.    BLADDER: Within normal limits.  REPRODUCTIVE ORGANS: Prostate is enlarged.    BOWEL: No bowel obstruction. Appendix is not visualized. No evidence of inflammation in the pericecal region.  PERITONEUM: No ascites.  VESSELS: Atherosclerotic changes. A few saccular aneurysms of the infrarenal abdominal aorta measuring upto 1.6 cm. IVC filter.  RETROPERITONEUM/LYMPH NODES: No lymphadenopathy.  ABDOMINAL WALL: Within normal limits.  BONES: Degenerative changes.    IMPRESSION:  Aneurysmal dilatation of the thoracic aorta as above suggesting an acute to subacute thoracic aortic injury. Correlation with emergent pre and postcontrast CT of the chest is recommended.

## 2021-07-26 NOTE — ED PROCEDURE NOTE - PROCEDURE ADDITIONAL DETAILS
18 gauge IV placed in the left basilic vein.  Good blood return, line flushed well.  Pt. tolerated procedure well w/o complications.

## 2021-07-26 NOTE — ED PROVIDER NOTE - PROGRESS NOTE DETAILS
Jameson PGY3 - Spoke w/ CT surg PA/physicianGustavo.  Requested to get a CTA chest and afterwards to have a discussion w/ the pt.  The management for this is surgery.  If pt. is amenable to receiving surgery, he can be transferred to [facility].  If not, then CT surg recommends BP management and outpt. f/u.  Attg having a discussion w/ pt. regarding obtaining CTA given pt. has CKD III.

## 2021-07-26 NOTE — ED PROVIDER NOTE - NS ED ROS FT
General: denies fever, chills.  HENT: denies nasal congestion.  Eyes: denies visual changes.  Neck: denies neck pain.  CV: denies chest pain.  Resp: denies difficulty breathing.  Abdominal: mild abd pain.  MSK: denies muscle aches.  Neuro: denies headaches.  Skin: denies rashes, cuts, bruises.  Hematologic: denies unexplained bruises.

## 2021-07-26 NOTE — ED PROVIDER NOTE - ATTENDING CONTRIBUTION TO CARE
paulie: sent to ed b/c of incidental finding of 7.7 aortic aneurism on ct done for diarrhea.  pt on coumadin for unclear reason "pacemaker as per patient".  family not in ED at present.  thoracic surgery contacted and will see pt. creatinine elevated last visit.  abd soft, non tender no r/g, pt feeling well, not in extremis or having complaints.  contacted family to have them also hear input from thoracic surgery/ surgery    I performed a history and physical exam of the patient and discussed their management with the resident and /or advanced care provider. I reviewed the resident and /or ACP's note and agree with the documented findings and plan of care. My medical decison making and observations are found above.

## 2021-07-26 NOTE — ED PROVIDER NOTE - NSRISKOFTRANSFER_ED_A_ED
Death or Disability/Transportation Risk (There is always a risk of traffic delays resulting in deterioration of condition.)

## 2021-07-26 NOTE — ED PROVIDER NOTE - OBJECTIVE STATEMENT
89M w/ PMHx of DVT w/ IVC filter placed in 2005, stage III CKD, PAD, PPM p/w AAA.  Pt was seen at [facility] in early July for diarrhea.  Labs significant for worsening MILAGROS, but pt. did not want to wait for CT.  Was discharged w/ return precautions and PCP f/u.  Had a CT on 7/23 and got a call 1.5 hours ago stating he has an aneurysm 7.7cm in the lower thoracic region.  Pt. denies any acute abd pain, lightheadedness, CP, SOB, diarrhea.  Pt. still on coumadin for pacemaker.

## 2021-07-26 NOTE — ED ADULT NURSE NOTE - OBJECTIVE STATEMENT
pt received to 1, aox4.  pt c/o "I was told to come to the ED because my CT scan from 3 days ago shows an aneurysm".  pt denies pain.  pt states he was in the ED at [facility] 3 days ago c/o abd pain when they did the CT scan.  pt is on tele monitor, v/s as noted.  awaitng MD nolasco, will monitor

## 2021-07-26 NOTE — ED ADULT NURSE REASSESSMENT NOTE - NS ED NURSE REASSESS COMMENT FT1
Pt received from day shift RN. Vitals as noted. Pt in no acute distress at this time. Respirations even and unlabored. US IV placed by MD. Comfort measures provided. Pt placed on cardiac monitor.

## 2021-07-26 NOTE — ED PROVIDER NOTE - CLINICAL SUMMARY MEDICAL DECISION MAKING FREE TEXT BOX
95M here with CT evidence of aortic aneurysm measuring over 7cm.  VSS, exam as above.  Will obtain labs, consult surgery, closely monitor BP, reassess, and likely admit. 89M here with CT evidence of aortic aneurysm measuring over 7cm.  VSS, exam as above.  Will obtain labs, consult surgery, closely monitor BP, reassess, and likely admit.    paulie: sent to ed b/c of incidental finding of 7.7 aortic aneurism on ct done for diarrhea.  pt on coumadin for unclear reason "pacemaker as per patient".  family not in ED at present.  thoracic surgery contacted and will see pt. creatinine elevated last visit.  abd soft, non tender no r/g, pt feeling well, not in extremis or having complaints.  contacted family to have them also hear input from thoracic surgery/ surgery

## 2021-07-26 NOTE — ED PROVIDER NOTE - PHYSICAL EXAMINATION
GENERAL: Patient awake alert NAD.  HEENT: NC/AT, Moist mucous membranes.  LUNGS: CTAB, no wheezes or crackles.  CARDIAC: RRR, no m/r/g.  ABDOMEN: Soft, no significant abd tendreness, ND, No rebound, guarding.  EXT: No edema. No calf tenderness. CV 2+DP bilaterally.  MSK: No pain with movement, no deformities.  NEURO: A&Ox3. Moving all extremities.  SKIN: B/l LE skin appears blue but pulses present. No ulcers visualized.  PSYCH: Normal affect.

## 2021-07-26 NOTE — CONSULT NOTE ADULT - ASSESSMENT
89M incidentally found to have a 7.7cm descending thoracic aortic aneurysm.    PLAN:  - Recommend CTSx evaluation  - f/u CTA Chest and A/P  - Further vascular surgery recommendations pending above mentioned studies.    Discussed with vascular surgery fellow Dr. Salmon.    ANUP Stinson, PGY-2  [facility]  C Team Surgery  [id number]

## 2021-07-27 DIAGNOSIS — I71.9 AORTIC ANEURYSM OF UNSPECIFIED SITE, WITHOUT RUPTURE: ICD-10-CM

## 2021-07-27 LAB
A1C WITH ESTIMATED AVERAGE GLUCOSE RESULT: 5.4 % — SIGNIFICANT CHANGE UP (ref 4–5.6)
ALBUMIN SERPL ELPH-MCNC: 4 G/DL — SIGNIFICANT CHANGE UP (ref 3.3–5)
ALP SERPL-CCNC: 77 U/L — SIGNIFICANT CHANGE UP (ref 40–120)
ALT FLD-CCNC: 21 U/L — SIGNIFICANT CHANGE UP (ref 10–45)
ANION GAP SERPL CALC-SCNC: 9 MMOL/L — SIGNIFICANT CHANGE UP (ref 5–17)
APPEARANCE UR: CLEAR — SIGNIFICANT CHANGE UP
AST SERPL-CCNC: 23 U/L — SIGNIFICANT CHANGE UP (ref 10–40)
B PERT DNA SPEC QL NAA+PROBE: SIGNIFICANT CHANGE UP
BACTERIA # UR AUTO: NEGATIVE — SIGNIFICANT CHANGE UP
BILIRUB SERPL-MCNC: 0.8 MG/DL — SIGNIFICANT CHANGE UP (ref 0.2–1.2)
BILIRUB UR-MCNC: NEGATIVE — SIGNIFICANT CHANGE UP
BLD GP AB SCN SERPL QL: NEGATIVE — SIGNIFICANT CHANGE UP
BUN SERPL-MCNC: 29 MG/DL — HIGH (ref 7–23)
C PNEUM DNA SPEC QL NAA+PROBE: SIGNIFICANT CHANGE UP
CALCIUM SERPL-MCNC: 9.4 MG/DL — SIGNIFICANT CHANGE UP (ref 8.4–10.5)
CHLORIDE SERPL-SCNC: 111 MMOL/L — HIGH (ref 96–108)
CO2 SERPL-SCNC: 22 MMOL/L — SIGNIFICANT CHANGE UP (ref 22–31)
COLOR SPEC: YELLOW — SIGNIFICANT CHANGE UP
COVID-19 SPIKE DOMAIN AB INTERP: POSITIVE
COVID-19 SPIKE DOMAIN ANTIBODY RESULT: 180 U/ML — HIGH
CREAT SERPL-MCNC: 1.33 MG/DL — HIGH (ref 0.5–1.3)
DIFF PNL FLD: NEGATIVE — SIGNIFICANT CHANGE UP
EPI CELLS # UR: 0 /HPF — SIGNIFICANT CHANGE UP
ESTIMATED AVERAGE GLUCOSE: 108 MG/DL — SIGNIFICANT CHANGE UP (ref 68–114)
FLUAV SUBTYP SPEC NAA+PROBE: SIGNIFICANT CHANGE UP
FLUBV RNA SPEC QL NAA+PROBE: SIGNIFICANT CHANGE UP
GLUCOSE SERPL-MCNC: 102 MG/DL — HIGH (ref 70–99)
GLUCOSE UR QL: NEGATIVE — SIGNIFICANT CHANGE UP
HADV DNA SPEC QL NAA+PROBE: SIGNIFICANT CHANGE UP
HCOV 229E RNA SPEC QL NAA+PROBE: SIGNIFICANT CHANGE UP
HCOV HKU1 RNA SPEC QL NAA+PROBE: SIGNIFICANT CHANGE UP
HCOV NL63 RNA SPEC QL NAA+PROBE: SIGNIFICANT CHANGE UP
HCOV OC43 RNA SPEC QL NAA+PROBE: SIGNIFICANT CHANGE UP
HMPV RNA SPEC QL NAA+PROBE: SIGNIFICANT CHANGE UP
HPIV1 RNA SPEC QL NAA+PROBE: SIGNIFICANT CHANGE UP
HPIV2 RNA SPEC QL NAA+PROBE: SIGNIFICANT CHANGE UP
HPIV3 RNA SPEC QL NAA+PROBE: SIGNIFICANT CHANGE UP
HPIV4 RNA SPEC QL NAA+PROBE: SIGNIFICANT CHANGE UP
HYALINE CASTS # UR AUTO: 0 /LPF — SIGNIFICANT CHANGE UP (ref 0–2)
KETONES UR-MCNC: NEGATIVE — SIGNIFICANT CHANGE UP
LEUKOCYTE ESTERASE UR-ACNC: NEGATIVE — SIGNIFICANT CHANGE UP
MAGNESIUM SERPL-MCNC: 2.2 MG/DL — SIGNIFICANT CHANGE UP (ref 1.6–2.6)
NITRITE UR-MCNC: NEGATIVE — SIGNIFICANT CHANGE UP
PH UR: 8 — SIGNIFICANT CHANGE UP (ref 5–8)
PHOSPHATE SERPL-MCNC: 2.2 MG/DL — LOW (ref 2.5–4.5)
POTASSIUM SERPL-MCNC: 4.3 MMOL/L — SIGNIFICANT CHANGE UP (ref 3.5–5.3)
POTASSIUM SERPL-SCNC: 4.3 MMOL/L — SIGNIFICANT CHANGE UP (ref 3.5–5.3)
PROT SERPL-MCNC: 6.5 G/DL — SIGNIFICANT CHANGE UP (ref 6–8.3)
PROT UR-MCNC: ABNORMAL
RAPID RVP RESULT: SIGNIFICANT CHANGE UP
RBC CASTS # UR COMP ASSIST: 1 /HPF — SIGNIFICANT CHANGE UP (ref 0–4)
RH IG SCN BLD-IMP: POSITIVE — SIGNIFICANT CHANGE UP
RH IG SCN BLD-IMP: POSITIVE — SIGNIFICANT CHANGE UP
RSV RNA SPEC QL NAA+PROBE: SIGNIFICANT CHANGE UP
RV+EV RNA SPEC QL NAA+PROBE: SIGNIFICANT CHANGE UP
SARS-COV-2 IGG+IGM SERPL QL IA: 180 U/ML — HIGH
SARS-COV-2 IGG+IGM SERPL QL IA: POSITIVE
SARS-COV-2 RNA SPEC QL NAA+PROBE: SIGNIFICANT CHANGE UP
SARS-COV-2 RNA SPEC QL NAA+PROBE: SIGNIFICANT CHANGE UP
SODIUM SERPL-SCNC: 142 MMOL/L — SIGNIFICANT CHANGE UP (ref 135–145)
SP GR SPEC: 1.05 — HIGH (ref 1.01–1.02)
TSH SERPL-MCNC: 7.06 UIU/ML — HIGH (ref 0.27–4.2)
UROBILINOGEN FLD QL: NEGATIVE — SIGNIFICANT CHANGE UP
WBC UR QL: 3 /HPF — SIGNIFICANT CHANGE UP (ref 0–5)

## 2021-07-27 PROCEDURE — 99232 SBSQ HOSP IP/OBS MODERATE 35: CPT

## 2021-07-27 PROCEDURE — 71045 X-RAY EXAM CHEST 1 VIEW: CPT | Mod: 26

## 2021-07-27 PROCEDURE — 99233 SBSQ HOSP IP/OBS HIGH 50: CPT

## 2021-07-27 RX ORDER — PRIMIDONE 250 MG/1
250 TABLET ORAL AT BEDTIME
Refills: 0 | Status: DISCONTINUED | OUTPATIENT
Start: 2021-07-27 | End: 2021-07-27

## 2021-07-27 RX ORDER — GABAPENTIN 400 MG/1
600 CAPSULE ORAL AT BEDTIME
Refills: 0 | Status: DISCONTINUED | OUTPATIENT
Start: 2021-07-27 | End: 2021-07-28

## 2021-07-27 RX ORDER — LABETALOL HCL 100 MG
200 TABLET ORAL EVERY 8 HOURS
Refills: 0 | Status: DISCONTINUED | OUTPATIENT
Start: 2021-07-27 | End: 2021-07-27

## 2021-07-27 RX ORDER — SENNA PLUS 8.6 MG/1
2 TABLET ORAL AT BEDTIME
Refills: 0 | Status: DISCONTINUED | OUTPATIENT
Start: 2021-07-27 | End: 2021-07-28

## 2021-07-27 RX ORDER — SODIUM CHLORIDE 9 MG/ML
250 INJECTION INTRAMUSCULAR; INTRAVENOUS; SUBCUTANEOUS ONCE
Refills: 0 | Status: COMPLETED | OUTPATIENT
Start: 2021-07-27 | End: 2021-07-27

## 2021-07-27 RX ORDER — ATORVASTATIN CALCIUM 80 MG/1
10 TABLET, FILM COATED ORAL AT BEDTIME
Refills: 0 | Status: DISCONTINUED | OUTPATIENT
Start: 2021-07-27 | End: 2021-07-28

## 2021-07-27 RX ORDER — FAMOTIDINE 10 MG/ML
20 INJECTION INTRAVENOUS DAILY
Refills: 0 | Status: DISCONTINUED | OUTPATIENT
Start: 2021-07-27 | End: 2021-07-28

## 2021-07-27 RX ORDER — LABETALOL HCL 100 MG
0.5 TABLET ORAL
Qty: 1000 | Refills: 0 | Status: DISCONTINUED | OUTPATIENT
Start: 2021-07-27 | End: 2021-07-27

## 2021-07-27 RX ORDER — GABAPENTIN 400 MG/1
400 CAPSULE ORAL
Refills: 0 | Status: DISCONTINUED | OUTPATIENT
Start: 2021-07-27 | End: 2021-07-28

## 2021-07-27 RX ORDER — TOPIRAMATE 25 MG
50 TABLET ORAL
Refills: 0 | Status: DISCONTINUED | OUTPATIENT
Start: 2021-07-27 | End: 2021-07-28

## 2021-07-27 RX ORDER — LABETALOL HCL 100 MG
200 TABLET ORAL EVERY 8 HOURS
Refills: 0 | Status: DISCONTINUED | OUTPATIENT
Start: 2021-07-27 | End: 2021-07-28

## 2021-07-27 RX ORDER — LEVOTHYROXINE SODIUM 125 MCG
50 TABLET ORAL DAILY
Refills: 0 | Status: DISCONTINUED | OUTPATIENT
Start: 2021-07-27 | End: 2021-07-28

## 2021-07-27 RX ORDER — LABETALOL HCL 100 MG
300 TABLET ORAL EVERY 8 HOURS
Refills: 0 | Status: DISCONTINUED | OUTPATIENT
Start: 2021-07-27 | End: 2021-07-27

## 2021-07-27 RX ADMIN — FAMOTIDINE 20 MILLIGRAM(S): 10 INJECTION INTRAVENOUS at 11:00

## 2021-07-27 RX ADMIN — GABAPENTIN 400 MILLIGRAM(S): 400 CAPSULE ORAL at 07:24

## 2021-07-27 RX ADMIN — GABAPENTIN 400 MILLIGRAM(S): 400 CAPSULE ORAL at 17:56

## 2021-07-27 RX ADMIN — Medication 50 MILLIGRAM(S): at 13:50

## 2021-07-27 RX ADMIN — Medication 200 MILLIGRAM(S): at 03:08

## 2021-07-27 RX ADMIN — Medication 50 MILLIGRAM(S): at 22:34

## 2021-07-27 RX ADMIN — SODIUM CHLORIDE 500 MILLILITER(S): 9 INJECTION INTRAMUSCULAR; INTRAVENOUS; SUBCUTANEOUS at 13:17

## 2021-07-27 RX ADMIN — GABAPENTIN 600 MILLIGRAM(S): 400 CAPSULE ORAL at 22:39

## 2021-07-27 RX ADMIN — Medication 200 MILLIGRAM(S): at 17:56

## 2021-07-27 RX ADMIN — Medication 50 MICROGRAM(S): at 07:24

## 2021-07-27 RX ADMIN — Medication 300 MILLIGRAM(S): at 09:28

## 2021-07-27 RX ADMIN — ATORVASTATIN CALCIUM 10 MILLIGRAM(S): 80 TABLET, FILM COATED ORAL at 22:39

## 2021-07-27 RX ADMIN — Medication 50 MILLIGRAM(S): at 07:24

## 2021-07-27 NOTE — HISTORY OF PRESENT ILLNESS
[FreeTextEntry1] : 89 yo male with a history of dvt s/p ivc filter (2005) on coumadin, s/p ppm, hld, chronic lower extremity dvt found on sonogram and left lower extremity traumatic wound after fall on july 4th presents today for follow up after treatment with unna boots\par pt without any complaints at this time. pt denies any lower extremity pain or ulcers \par pt has been wearing compression stockings daily \par 
Yes

## 2021-07-27 NOTE — PROGRESS NOTE ADULT - PROBLEM SELECTOR PLAN 1
Arterial line placed, labetalol infusion initiated. Goal SBP < 120.  F/u with vascular surgery and discuss with Dr. Ochoa whether patient may be candidate for surgical intervention.  Dr Ochoa to discuss with Dr Fernandez for possible intervention   Hold coumadin (indication: b/l DVTs) for now until plan is determined   Continue home topamax, primidone, gabapentin, lipitor.  labetalol 300 tid for  BP control

## 2021-07-27 NOTE — PROGRESS NOTE ADULT - SUBJECTIVE AND OBJECTIVE BOX
Interval Hx; Events Overnight:  I feel fine    LABS:                13.4                 142  | 22   | 29           5.21  >-----------< 88      ------------------------< 102                   42.5                 4.3  | 111  | 1.33                                         Ca 9.4   Mg 2.2   Ph 2.2        PT/INR - ( 2021 16:38 )   PT: 21.6 sec;   INR: 1.94 ratio         PTT - ( 2021 16:38 )  PTT:53.3 sec    VITAL SIGNS    Telemetry: SR 1st degree A pace 60s     Daily Height in cm: 185.42 (2021 23:02)    Daily Weight in k.3 (2021 00:00)      Vital Signs Last 24 Hrs  T(C): 36.3 (21 @ 11:44), Max: 36.8 (21 @ 04:00)  T(F): 97.4 (21 @ 11:44), Max: 98.3 (21 @ 04:00)  HR: 61 (21 @ 12:00) (54 - 66)  BP: 108/60 (21 @ 11:44) (100/56 - 178/84)  RR: 18 (21 @ 12:00) (14 - 30)  SpO2: 100% (21 @ 12:00) (96% - 100%)             I&O's Detail    2021 07:  -  2021 07:00  --------------------------------------------------------  IN:    Labetalol: 35.5 mL  Total IN: 35.5 mL    OUT:    Voided (mL): 100 mL  Total OUT: 100 mL    Total NET: -64.5 mL      2021 07:01  -  2021 12:21  --------------------------------------------------------  IN:    Labetalol: 4.5 mL  Total IN: 4.5 mL    OUT:  Total OUT: 0 mL    Total NET: 4.5 mL                    GLUCOSE  CAPILLARY BLOOD GLUCOSE                    L Art line      PHYSICAL EXAM      General: NAD, well appearing, in no distress  Neurology: A&O x3, non focal, no neuro deficits. Moves all extremities to command.  CV : s1 s2 RRR, no murmurs, gallops, clicks.   Lungs: clear to auscultation  Abdomen: soft, nontender, nondistended, positive bowel sounds, +BM  :    voiding         Extremities:    no  edema. + pedal pulses      Skin: intact, no lesions  PVD/PAD        MEDICATIONS  atorvastatin 10 milliGRAM(s) Oral at bedtime  famotidine    Tablet 20 milliGRAM(s) Oral daily  gabapentin 400 milliGRAM(s) Oral two times a day  gabapentin 600 milliGRAM(s) Oral at bedtime  labetalol 300 milliGRAM(s) Oral every 8 hours  levothyroxine 50 MICROGram(s) Oral daily  primidone 250 milliGRAM(s) Oral at bedtime  senna 2 Tablet(s) Oral at bedtime  topiramate 50 milliGRAM(s) Oral <User Schedule>

## 2021-07-27 NOTE — H&P ADULT - HISTORY OF PRESENT ILLNESS
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 Patient has occasional back pain and lower abdominal discomfort recently related to diarrhea. Patient has had nausea and loss of appetite recently with poor po intake and 12 pound weight loss. Patient presented to [facility] on 7/7 with these complaints and states "one of the cat scan machines was broken and I didn't want to wait 5 hours for the scan" and therefore [facility] staff told him to follow up with his PCP for an outpatient CT which revealed the aortic findings mentioned and he was referred back to [facility]. Patient is relatively healthy with h/o GB removal, TURP, ambulates with a walker, limited activity due to spinal stenosis, h/o PPM, and DVT many years ago s/p IVC filter. Denies any current chest or back pain or history of hypertension.

## 2021-07-27 NOTE — H&P ADULT - ASSESSMENT
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 Patient has occasional back pain and lower abdominal discomfort recently related to diarrhea. Patient has had nausea and loss of appetite recently with poor po intake and 12 pound weight loss. Patient presented to [facility] on 7/7 with these complaints and states "one of the cat scan machines was broken and I didn't want to wait 5 hours for the scan" and therefore [facility] staff told him to follow up with his PCP for an outpatient CT which revealed the aortic findings mentioned and he was referred back to [facility]. Patient is relatively healthy with h/o GB removal, TURP, ambulates with a walker, limited activity due to spinal stenosis, h/o PPM, and DVT many years ago s/p IVC filter. Denies any current chest or back pain or history of hypertension. CTA C/A/P at [facility] revealed At least 6.4 cm descending thoracic aortic aneurysm is unchanged since recent CT 7/23/2021. Heterogeneous high density fluid/hematoma is noted adjacent to or possibly within an irregular enlarged aneurysm sac, concerning for impending aortic rupture. Additional 5.7 cm abdominal aortic and smaller saccular aortic aneurysms, unchanged. Unchanged small left pleural effusion. Transferred to [facility] CTU under the care of Dr. Ochoa. Vascular surgery consulted at [facility].

## 2021-07-27 NOTE — PROGRESS NOTE ADULT - ASSESSMENT
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 Patient has occasional back pain and lower abdominal discomfort recently related to diarrhea. Patient has had nausea and loss of appetite recently with poor po intake and 12 pound weight loss. Patient presented to [facility] on 7/7 with these complaints and states "one of the cat scan machines was broken and I didn't want to wait 5 hours for the scan" and therefore [facility] staff told him to follow up with his PCP for an outpatient CT which revealed the aortic findings mentioned and he was referred back to [facility]. Patient is relatively healthy with h/o GB removal, TURP, ambulates with a walker, limited activity due to spinal stenosis, h/o PPM, and DVT many years ago s/p IVC filter. Denies any current chest or back pain or history of hypertension. CTA C/A/P at [facility] revealed At least 6.4 cm descending thoracic aortic aneurysm is unchanged since recent CT 7/23/2021. Heterogeneous high density fluid/hematoma is noted adjacent to or possibly within an irregular enlarged aneurysm sac, concerning for impending aortic rupture. Additional 5.7 cm abdominal aortic and smaller saccular aortic aneurysms, unchanged. Unchanged small left pleural effusion. Transferred to [facility] CTU under the care of Dr. Ochoa. Vascular surgery consulted at [facility].    7/27 HD stable, tolerating labetalol 300 tid. L art line maintained. Transferred to [facility] stepdown.

## 2021-07-27 NOTE — H&P ADULT - NSICDXPASTMEDICALHX_GEN_ALL_CORE_FT
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

## 2021-07-27 NOTE — H&P ADULT - NSICDXPASTSURGICALHX_GEN_ALL_CORE_FT
PAST SURGICAL HISTORY:  H/O  TURP (Transurethral Resection of Prostate)     H/O Pacemaker last replaced in april of 2011.    S/P Cholecystectomy     S/P tonsillectomy in 1932

## 2021-07-27 NOTE — PROGRESS NOTE ADULT - SUBJECTIVE AND OBJECTIVE BOX
CRITICAL CARE ATTENDING - CTICU    MEDICATIONS  (STANDING):  atorvastatin 10 milliGRAM(s) Oral at bedtime  famotidine    Tablet 20 milliGRAM(s) Oral daily  gabapentin 400 milliGRAM(s) Oral two times a day  gabapentin 600 milliGRAM(s) Oral at bedtime  labetalol 200 milliGRAM(s) Oral every 8 hours  labetalol Infusion 0.5 mG/Min (7.5 mL/Hr) IV Continuous <Continuous>  levothyroxine 50 MICROGram(s) Oral daily  primidone 250 milliGRAM(s) Oral at bedtime  senna 2 Tablet(s) Oral at bedtime  topiramate 50 milliGRAM(s) Oral <User Schedule>                                    13.4   5.21  )-----------( 88       ( 2021 23:44 )             42.5           142  |  111<H>  |  29<H>  ----------------------------<  102<H>  4.3   |  22  |  1.33<H>    Ca    9.4      2021 23:44  Phos  2.2       Mg     2.2         TPro  6.5  /  Alb  4.0  /  TBili  0.8  /  DBili  x   /  AST  23  /  ALT  21  /  AlkPhos  77        PT/INR - ( 2021 16:38 )   PT: 21.6 sec;   INR: 1.94 ratio         PTT - ( 2021 16:38 )  PTT:53.3 sec        Daily Height in cm: 185.42 (2021 23:02)    Daily Weight in k.3 (2021 00:00)       @ 07:01  -   @ 07:00  --------------------------------------------------------  IN: 35.5 mL / OUT: 100 mL / NET: -64.5 mL        Critically Ill patient  : [ ] preoperative ,   [ x] post operative    Requires :  [ x] Arterial Line   [ ] Central Line  [ ] PA catheter  [ ] IABP  [ ] ECMO  [ ] LVAD  [ ] Ventilator  [ ] pacemaker [ ] Impella.                      [ x] ABG's     [x ] Pulse Oxymetry Monitoring  Bedside evaluation , monitoring , treatment of hemodynamics , fluids , IVP/ IVCD meds.        Diagnosis:     Admitted with descending aortic aneurysm     Hypertension - IVCD Labetalol     Renal Failure - Acute Kidney Injury     Thrombocytopenia     Hx of DVT s/p IVC filter           By signing my name below, I, Kristan Gibson, attest that this documentation has been prepared under the direction and in the presence of Jose Antonio Clifton MD.   Electronically Signed: Alana Montano 21 @ 07:33      Discussed with CT surgeon, Physician Assistant - Nurse Practitioner- Critical care medicine team.   Discussed at  AM / PM rounds.   Chart, labs , films reviewed.    Cumulative Critical Care Time Given Today:  CRITICAL CARE ATTENDING - CTICU    MEDICATIONS  (STANDING):  atorvastatin 10 milliGRAM(s) Oral at bedtime  famotidine    Tablet 20 milliGRAM(s) Oral daily  gabapentin 400 milliGRAM(s) Oral two times a day  gabapentin 600 milliGRAM(s) Oral at bedtime  labetalol 200 milliGRAM(s) Oral every 8 hours  labetalol Infusion 0.5 mG/Min (7.5 mL/Hr) IV Continuous <Continuous>  levothyroxine 50 MICROGram(s) Oral daily  primidone 250 milliGRAM(s) Oral at bedtime  senna 2 Tablet(s) Oral at bedtime  topiramate 50 milliGRAM(s) Oral <User Schedule>                                    13.4   5.21  )-----------( 88       ( 2021 23:44 )             42.5           142  |  111<H>  |  29<H>  ----------------------------<  102<H>  4.3   |  22  |  1.33<H>    Ca    9.4      2021 23:44  Phos  2.2       Mg     2.2         TPro  6.5  /  Alb  4.0  /  TBili  0.8  /  DBili  x   /  AST  23  /  ALT  21  /  AlkPhos  77        PT/INR - ( 2021 16:38 )   PT: 21.6 sec;   INR: 1.94 ratio         PTT - ( 2021 16:38 )  PTT:53.3 sec        Daily Height in cm: 185.42 (2021 23:02)    Daily Weight in k.3 (2021 00:00)       @ 07:01  -   @ 07:00  --------------------------------------------------------  IN: 35.5 mL / OUT: 100 mL / NET: -64.5 mL        Critically Ill patient  : [ ] preoperative ,   [ x] post operative    Requires :  [ x] Arterial Line   [ ] Central Line  [ ] PA catheter  [ ] IABP  [ ] ECMO  [ ] LVAD  [ ] Ventilator  [ ] pacemaker [ ] Impella.                      [ x] ABG's     [x ] Pulse Oxymetry Monitoring  Bedside evaluation , monitoring , treatment of hemodynamics , fluids , IVP/ IVCD meds.        Diagnosis:     Admitted with descending aortic aneurysm     Hypertension - IVCD / IVP / PO  Labetalol dosing / conversion    Renal Failure - Acute Kidney Injury     Thrombocytopenia     Hx of DVT s/p IVC filter     Requires bedside physical therapy, mobilization and total intermediate care.               By signing my name below, I, Kristan Gibson, attest that this documentation has been prepared under the direction and in the presence of Jose Antonio Clifton MD.   Electronically Signed: Alana Montano 07-27-21 @ 07:33    I, Jose Antonio Clifton, personally performed the services described in this documentation. All medical record entries made by the scribe were at my direction and in my presence. I have reviewed the chart and agree that the record reflects my personal performance and is accurate and complete.   Jose Antonio Clifton MD.       Discussed with CT surgeon, Physician Assistant - Nurse Practitioner- Critical care medicine team.   Discussed at  AM / PM rounds.   Chart, labs , films reviewed.    Cumulative Critical Care Time Given Today:  20 min

## 2021-07-27 NOTE — H&P ADULT - NSHPLABSRESULTS_GEN_ALL_CORE
CTA chest/abd/pelvis: At least 6.4 cm descending thoracic aortic aneurysm is unchanged since recent CT 7/23/2021. Heterogeneous high density fluid/hematoma is noted adjacent to or possibly within an irregular enlarged aneurysm sac, concerning for impending aortic rupture. Additional 5.7 cm abdominal aortic and smaller saccular aortic aneurysms, unchanged. Unchanged small left pleural effusion.

## 2021-07-27 NOTE — H&P ADULT - NSHPPHYSICALEXAM_GEN_ALL_CORE
General: Appears comfortable sitting in bed  Neuro: A&Ox4  Card: +S1, S2. Atrially paced @ 60. /60.  Pulm: CTA b/l  Abd: soft, nontender, nondistended. +BS, +BM today.  Ext: No LE edema, no calf tenderness, +DP/PT pulses.

## 2021-07-28 ENCOUNTER — TRANSCRIPTION ENCOUNTER (OUTPATIENT)
Age: 86
End: 2021-07-28

## 2021-07-28 ENCOUNTER — INPATIENT (INPATIENT)
Facility: HOSPITAL | Age: 86
LOS: 6 days | Discharge: HOME HEALTH | DRG: 220 | End: 2021-08-04
Attending: THORACIC SURGERY (CARDIOTHORACIC VASCULAR SURGERY) | Admitting: THORACIC SURGERY (CARDIOTHORACIC VASCULAR SURGERY)
Payer: MEDICARE

## 2021-07-28 VITALS
HEART RATE: 60 BPM | DIASTOLIC BLOOD PRESSURE: 58 MMHG | SYSTOLIC BLOOD PRESSURE: 124 MMHG | RESPIRATION RATE: 16 BRPM | OXYGEN SATURATION: 99 %

## 2021-07-28 VITALS — WEIGHT: 228.4 LBS

## 2021-07-28 LAB
A1C WITH ESTIMATED AVERAGE GLUCOSE RESULT: 5.6 % — SIGNIFICANT CHANGE UP (ref 4–5.6)
ALBUMIN SERPL ELPH-MCNC: 3.5 G/DL — SIGNIFICANT CHANGE UP (ref 3.3–5)
ALBUMIN SERPL ELPH-MCNC: 3.5 G/DL — SIGNIFICANT CHANGE UP (ref 3.3–5)
ALP SERPL-CCNC: 66 U/L — SIGNIFICANT CHANGE UP (ref 40–120)
ALP SERPL-CCNC: 67 U/L — SIGNIFICANT CHANGE UP (ref 40–120)
ALT FLD-CCNC: 17 U/L — SIGNIFICANT CHANGE UP (ref 10–45)
ALT FLD-CCNC: 17 U/L — SIGNIFICANT CHANGE UP (ref 10–45)
ANION GAP SERPL CALC-SCNC: 11 MMOL/L — SIGNIFICANT CHANGE UP (ref 5–17)
ANION GAP SERPL CALC-SCNC: 13 MMOL/L — SIGNIFICANT CHANGE UP (ref 5–17)
ANION GAP SERPL CALC-SCNC: 9 MMOL/L — SIGNIFICANT CHANGE UP (ref 5–17)
APTT BLD: 30.2 SEC — SIGNIFICANT CHANGE UP (ref 27.5–35.5)
AST SERPL-CCNC: 16 U/L — SIGNIFICANT CHANGE UP (ref 10–40)
AST SERPL-CCNC: 16 U/L — SIGNIFICANT CHANGE UP (ref 10–40)
BASOPHILS # BLD AUTO: 0.01 K/UL — SIGNIFICANT CHANGE UP (ref 0–0.2)
BASOPHILS NFR BLD AUTO: 0.2 % — SIGNIFICANT CHANGE UP (ref 0–2)
BILIRUB SERPL-MCNC: 0.4 MG/DL — SIGNIFICANT CHANGE UP (ref 0.2–1.2)
BILIRUB SERPL-MCNC: 0.5 MG/DL — SIGNIFICANT CHANGE UP (ref 0.2–1.2)
BLD GP AB SCN SERPL QL: NEGATIVE — SIGNIFICANT CHANGE UP
BLD GP AB SCN SERPL QL: NEGATIVE — SIGNIFICANT CHANGE UP
BUN SERPL-MCNC: 32 MG/DL — HIGH (ref 7–23)
BUN SERPL-MCNC: 33 MG/DL — HIGH (ref 7–23)
BUN SERPL-MCNC: 33 MG/DL — HIGH (ref 7–23)
CALCIUM SERPL-MCNC: 9.1 MG/DL — SIGNIFICANT CHANGE UP (ref 8.4–10.5)
CALCIUM SERPL-MCNC: 9.2 MG/DL — SIGNIFICANT CHANGE UP (ref 8.4–10.5)
CALCIUM SERPL-MCNC: 9.2 MG/DL — SIGNIFICANT CHANGE UP (ref 8.4–10.5)
CHLORIDE SERPL-SCNC: 112 MMOL/L — HIGH (ref 96–108)
CHLORIDE SERPL-SCNC: 112 MMOL/L — HIGH (ref 96–108)
CHLORIDE SERPL-SCNC: 113 MMOL/L — HIGH (ref 96–108)
CO2 SERPL-SCNC: 18 MMOL/L — LOW (ref 22–31)
CO2 SERPL-SCNC: 20 MMOL/L — LOW (ref 22–31)
CO2 SERPL-SCNC: 22 MMOL/L — SIGNIFICANT CHANGE UP (ref 22–31)
CREAT SERPL-MCNC: 1.51 MG/DL — HIGH (ref 0.5–1.3)
CREAT SERPL-MCNC: 1.51 MG/DL — HIGH (ref 0.5–1.3)
CREAT SERPL-MCNC: 1.56 MG/DL — HIGH (ref 0.5–1.3)
EOSINOPHIL # BLD AUTO: 0.12 K/UL — SIGNIFICANT CHANGE UP (ref 0–0.5)
EOSINOPHIL NFR BLD AUTO: 2.8 % — SIGNIFICANT CHANGE UP (ref 0–6)
ESTIMATED AVERAGE GLUCOSE: 114 MG/DL — SIGNIFICANT CHANGE UP (ref 68–114)
GAS PNL BLDA: SIGNIFICANT CHANGE UP
GAS PNL BLDA: SIGNIFICANT CHANGE UP
GLUCOSE SERPL-MCNC: 108 MG/DL — HIGH (ref 70–99)
GLUCOSE SERPL-MCNC: 136 MG/DL — HIGH (ref 70–99)
GLUCOSE SERPL-MCNC: 143 MG/DL — HIGH (ref 70–99)
HCT VFR BLD CALC: 35.7 % — LOW (ref 39–50)
HCT VFR BLD CALC: 36.3 % — LOW (ref 39–50)
HCT VFR BLD CALC: 36.6 % — LOW (ref 39–50)
HGB BLD-MCNC: 11.2 G/DL — LOW (ref 13–17)
HGB BLD-MCNC: 11.3 G/DL — LOW (ref 13–17)
HGB BLD-MCNC: 11.6 G/DL — LOW (ref 13–17)
IMM GRANULOCYTES NFR BLD AUTO: 0.5 % — SIGNIFICANT CHANGE UP (ref 0–1.5)
INR BLD: 1.88 — HIGH (ref 0.88–1.16)
LACTATE SERPL-SCNC: 1.3 MMOL/L — SIGNIFICANT CHANGE UP (ref 0.5–2)
LYMPHOCYTES # BLD AUTO: 0.57 K/UL — LOW (ref 1–3.3)
LYMPHOCYTES # BLD AUTO: 13.1 % — SIGNIFICANT CHANGE UP (ref 13–44)
MCHC RBC-ENTMCNC: 28.7 PG — SIGNIFICANT CHANGE UP (ref 27–34)
MCHC RBC-ENTMCNC: 28.9 PG — SIGNIFICANT CHANGE UP (ref 27–34)
MCHC RBC-ENTMCNC: 29.3 PG — SIGNIFICANT CHANGE UP (ref 27–34)
MCHC RBC-ENTMCNC: 31.1 GM/DL — LOW (ref 32–36)
MCHC RBC-ENTMCNC: 31.4 GM/DL — LOW (ref 32–36)
MCHC RBC-ENTMCNC: 31.7 GM/DL — LOW (ref 32–36)
MCV RBC AUTO: 92.1 FL — SIGNIFICANT CHANGE UP (ref 80–100)
MCV RBC AUTO: 92.2 FL — SIGNIFICANT CHANGE UP (ref 80–100)
MCV RBC AUTO: 92.4 FL — SIGNIFICANT CHANGE UP (ref 80–100)
MONOCYTES # BLD AUTO: 0.31 K/UL — SIGNIFICANT CHANGE UP (ref 0–0.9)
MONOCYTES NFR BLD AUTO: 7.1 % — SIGNIFICANT CHANGE UP (ref 2–14)
NEUTROPHILS # BLD AUTO: 3.31 K/UL — SIGNIFICANT CHANGE UP (ref 1.8–7.4)
NEUTROPHILS NFR BLD AUTO: 76.3 % — SIGNIFICANT CHANGE UP (ref 43–77)
NRBC # BLD: 0 /100 WBCS — SIGNIFICANT CHANGE UP (ref 0–0)
NT-PROBNP SERPL-SCNC: 613 PG/ML — HIGH (ref 0–300)
PLATELET # BLD AUTO: 74 K/UL — LOW (ref 150–400)
PLATELET # BLD AUTO: 86 K/UL — LOW (ref 150–400)
PLATELET # BLD AUTO: 92 K/UL — LOW (ref 150–400)
POTASSIUM SERPL-MCNC: 3.5 MMOL/L — SIGNIFICANT CHANGE UP (ref 3.5–5.3)
POTASSIUM SERPL-MCNC: 3.9 MMOL/L — SIGNIFICANT CHANGE UP (ref 3.5–5.3)
POTASSIUM SERPL-MCNC: 4 MMOL/L — SIGNIFICANT CHANGE UP (ref 3.5–5.3)
POTASSIUM SERPL-SCNC: 3.5 MMOL/L — SIGNIFICANT CHANGE UP (ref 3.5–5.3)
POTASSIUM SERPL-SCNC: 3.9 MMOL/L — SIGNIFICANT CHANGE UP (ref 3.5–5.3)
POTASSIUM SERPL-SCNC: 4 MMOL/L — SIGNIFICANT CHANGE UP (ref 3.5–5.3)
PROT SERPL-MCNC: 5.4 G/DL — LOW (ref 6–8.3)
PROT SERPL-MCNC: 5.5 G/DL — LOW (ref 6–8.3)
PROTHROM AB SERPL-ACNC: 21.9 SEC — HIGH (ref 10.6–13.6)
RBC # BLD: 3.87 M/UL — LOW (ref 4.2–5.8)
RBC # BLD: 3.94 M/UL — LOW (ref 4.2–5.8)
RBC # BLD: 3.96 M/UL — LOW (ref 4.2–5.8)
RBC # FLD: 14.7 % — HIGH (ref 10.3–14.5)
RBC # FLD: 14.8 % — HIGH (ref 10.3–14.5)
RBC # FLD: 14.9 % — HIGH (ref 10.3–14.5)
RH IG SCN BLD-IMP: POSITIVE — SIGNIFICANT CHANGE UP
RH IG SCN BLD-IMP: POSITIVE — SIGNIFICANT CHANGE UP
SODIUM SERPL-SCNC: 143 MMOL/L — SIGNIFICANT CHANGE UP (ref 135–145)
SODIUM SERPL-SCNC: 143 MMOL/L — SIGNIFICANT CHANGE UP (ref 135–145)
SODIUM SERPL-SCNC: 144 MMOL/L — SIGNIFICANT CHANGE UP (ref 135–145)
TSH SERPL-MCNC: 4.45 UIU/ML — HIGH (ref 0.27–4.2)
WBC # BLD: 3.81 K/UL — SIGNIFICANT CHANGE UP (ref 3.8–10.5)
WBC # BLD: 4.34 K/UL — SIGNIFICANT CHANGE UP (ref 3.8–10.5)
WBC # BLD: 4.73 K/UL — SIGNIFICANT CHANGE UP (ref 3.8–10.5)
WBC # FLD AUTO: 3.81 K/UL — SIGNIFICANT CHANGE UP (ref 3.8–10.5)
WBC # FLD AUTO: 4.34 K/UL — SIGNIFICANT CHANGE UP (ref 3.8–10.5)
WBC # FLD AUTO: 4.73 K/UL — SIGNIFICANT CHANGE UP (ref 3.8–10.5)

## 2021-07-28 PROCEDURE — 86850 RBC ANTIBODY SCREEN: CPT

## 2021-07-28 PROCEDURE — 84443 ASSAY THYROID STIM HORMONE: CPT

## 2021-07-28 PROCEDURE — 99238 HOSP IP/OBS DSCHRG MGMT 30/<: CPT | Mod: 25

## 2021-07-28 PROCEDURE — 85014 HEMATOCRIT: CPT

## 2021-07-28 PROCEDURE — 36620 INSERTION CATHETER ARTERY: CPT

## 2021-07-28 PROCEDURE — 83605 ASSAY OF LACTIC ACID: CPT

## 2021-07-28 PROCEDURE — 84100 ASSAY OF PHOSPHORUS: CPT

## 2021-07-28 PROCEDURE — 85018 HEMOGLOBIN: CPT

## 2021-07-28 PROCEDURE — 80048 BASIC METABOLIC PNL TOTAL CA: CPT

## 2021-07-28 PROCEDURE — 93306 TTE W/DOPPLER COMPLETE: CPT | Mod: 26

## 2021-07-28 PROCEDURE — 82803 BLOOD GASES ANY COMBINATION: CPT

## 2021-07-28 PROCEDURE — 86901 BLOOD TYPING SEROLOGIC RH(D): CPT

## 2021-07-28 PROCEDURE — 80053 COMPREHEN METABOLIC PANEL: CPT

## 2021-07-28 PROCEDURE — 93010 ELECTROCARDIOGRAM REPORT: CPT

## 2021-07-28 PROCEDURE — 84295 ASSAY OF SERUM SODIUM: CPT

## 2021-07-28 PROCEDURE — U0003: CPT

## 2021-07-28 PROCEDURE — 93880 EXTRACRANIAL BILAT STUDY: CPT | Mod: 26

## 2021-07-28 PROCEDURE — 83036 HEMOGLOBIN GLYCOSYLATED A1C: CPT

## 2021-07-28 PROCEDURE — 82947 ASSAY GLUCOSE BLOOD QUANT: CPT

## 2021-07-28 PROCEDURE — 83735 ASSAY OF MAGNESIUM: CPT

## 2021-07-28 PROCEDURE — 71045 X-RAY EXAM CHEST 1 VIEW: CPT | Mod: 26,77

## 2021-07-28 PROCEDURE — 71045 X-RAY EXAM CHEST 1 VIEW: CPT

## 2021-07-28 PROCEDURE — 85025 COMPLETE CBC W/AUTO DIFF WBC: CPT

## 2021-07-28 PROCEDURE — 85027 COMPLETE CBC AUTOMATED: CPT

## 2021-07-28 PROCEDURE — U0005: CPT

## 2021-07-28 PROCEDURE — 86900 BLOOD TYPING SEROLOGIC ABO: CPT

## 2021-07-28 PROCEDURE — 99223 1ST HOSP IP/OBS HIGH 75: CPT | Mod: 25

## 2021-07-28 PROCEDURE — 99291 CRITICAL CARE FIRST HOUR: CPT

## 2021-07-28 PROCEDURE — 82330 ASSAY OF CALCIUM: CPT

## 2021-07-28 PROCEDURE — 86769 SARS-COV-2 COVID-19 ANTIBODY: CPT

## 2021-07-28 PROCEDURE — 71045 X-RAY EXAM CHEST 1 VIEW: CPT | Mod: 26

## 2021-07-28 PROCEDURE — 81001 URINALYSIS AUTO W/SCOPE: CPT

## 2021-07-28 PROCEDURE — 84132 ASSAY OF SERUM POTASSIUM: CPT

## 2021-07-28 PROCEDURE — 82435 ASSAY OF BLOOD CHLORIDE: CPT

## 2021-07-28 RX ORDER — LABETALOL HCL 100 MG
200 TABLET ORAL EVERY 8 HOURS
Refills: 0 | Status: DISCONTINUED | OUTPATIENT
Start: 2021-07-28 | End: 2021-07-28

## 2021-07-28 RX ORDER — GABAPENTIN 400 MG/1
400 CAPSULE ORAL
Refills: 0 | Status: DISCONTINUED | OUTPATIENT
Start: 2021-07-28 | End: 2021-08-04

## 2021-07-28 RX ORDER — ACETAMINOPHEN 500 MG
650 TABLET ORAL EVERY 6 HOURS
Refills: 0 | Status: DISCONTINUED | OUTPATIENT
Start: 2021-07-28 | End: 2021-08-04

## 2021-07-28 RX ORDER — ALBUMIN HUMAN 25 %
250 VIAL (ML) INTRAVENOUS ONCE
Refills: 0 | Status: COMPLETED | OUTPATIENT
Start: 2021-07-28 | End: 2021-07-28

## 2021-07-28 RX ORDER — ATORVASTATIN CALCIUM 80 MG/1
10 TABLET, FILM COATED ORAL AT BEDTIME
Refills: 0 | Status: DISCONTINUED | OUTPATIENT
Start: 2021-07-28 | End: 2021-08-04

## 2021-07-28 RX ORDER — TOPIRAMATE 25 MG
50 TABLET ORAL DAILY
Refills: 0 | Status: DISCONTINUED | OUTPATIENT
Start: 2021-07-28 | End: 2021-08-04

## 2021-07-28 RX ORDER — GABAPENTIN 400 MG/1
600 CAPSULE ORAL AT BEDTIME
Refills: 0 | Status: DISCONTINUED | OUTPATIENT
Start: 2021-07-28 | End: 2021-08-04

## 2021-07-28 RX ORDER — TOPIRAMATE 25 MG
1 TABLET ORAL
Qty: 0 | Refills: 0 | DISCHARGE
Start: 2021-07-28

## 2021-07-28 RX ORDER — PANTOPRAZOLE SODIUM 20 MG/1
40 TABLET, DELAYED RELEASE ORAL
Refills: 0 | Status: DISCONTINUED | OUTPATIENT
Start: 2021-07-28 | End: 2021-08-04

## 2021-07-28 RX ORDER — FAMOTIDINE 10 MG/ML
1 INJECTION INTRAVENOUS
Qty: 0 | Refills: 0 | DISCHARGE
Start: 2021-07-28

## 2021-07-28 RX ORDER — LABETALOL HCL 100 MG
1 TABLET ORAL
Qty: 0 | Refills: 0 | DISCHARGE
Start: 2021-07-28

## 2021-07-28 RX ORDER — GABAPENTIN 400 MG/1
1 CAPSULE ORAL
Qty: 0 | Refills: 0 | DISCHARGE
Start: 2021-07-28

## 2021-07-28 RX ORDER — SENNA PLUS 8.6 MG/1
2 TABLET ORAL AT BEDTIME
Refills: 0 | Status: DISCONTINUED | OUTPATIENT
Start: 2021-07-28 | End: 2021-08-04

## 2021-07-28 RX ORDER — POTASSIUM CHLORIDE 20 MEQ
20 PACKET (EA) ORAL ONCE
Refills: 0 | Status: COMPLETED | OUTPATIENT
Start: 2021-07-28 | End: 2021-07-28

## 2021-07-28 RX ORDER — ATORVASTATIN CALCIUM 80 MG/1
1 TABLET, FILM COATED ORAL
Qty: 0 | Refills: 0 | DISCHARGE
Start: 2021-07-28

## 2021-07-28 RX ORDER — NICARDIPINE HYDROCHLORIDE 30 MG/1
5 CAPSULE, EXTENDED RELEASE ORAL
Qty: 40 | Refills: 0 | Status: DISCONTINUED | OUTPATIENT
Start: 2021-07-28 | End: 2021-07-30

## 2021-07-28 RX ORDER — LEVOTHYROXINE SODIUM 125 MCG
50 TABLET ORAL DAILY
Refills: 0 | Status: DISCONTINUED | OUTPATIENT
Start: 2021-07-28 | End: 2021-08-04

## 2021-07-28 RX ORDER — SENNA PLUS 8.6 MG/1
2 TABLET ORAL
Qty: 0 | Refills: 0 | DISCHARGE
Start: 2021-07-28

## 2021-07-28 RX ORDER — LEVOTHYROXINE SODIUM 125 MCG
1 TABLET ORAL
Qty: 0 | Refills: 0 | DISCHARGE
Start: 2021-07-28

## 2021-07-28 RX ORDER — SODIUM CHLORIDE 9 MG/ML
3 INJECTION INTRAMUSCULAR; INTRAVENOUS; SUBCUTANEOUS EVERY 8 HOURS
Refills: 0 | Status: DISCONTINUED | OUTPATIENT
Start: 2021-07-28 | End: 2021-08-04

## 2021-07-28 RX ADMIN — SODIUM CHLORIDE 3 MILLILITER(S): 9 INJECTION INTRAMUSCULAR; INTRAVENOUS; SUBCUTANEOUS at 22:24

## 2021-07-28 RX ADMIN — SENNA PLUS 2 TABLET(S): 8.6 TABLET ORAL at 06:51

## 2021-07-28 RX ADMIN — SODIUM CHLORIDE 3 MILLILITER(S): 9 INJECTION INTRAMUSCULAR; INTRAVENOUS; SUBCUTANEOUS at 13:39

## 2021-07-28 RX ADMIN — Medication 20 MILLIEQUIVALENT(S): at 08:43

## 2021-07-28 RX ADMIN — ATORVASTATIN CALCIUM 10 MILLIGRAM(S): 80 TABLET, FILM COATED ORAL at 22:23

## 2021-07-28 RX ADMIN — GABAPENTIN 400 MILLIGRAM(S): 400 CAPSULE ORAL at 17:33

## 2021-07-28 RX ADMIN — Medication 200 MILLIGRAM(S): at 13:39

## 2021-07-28 RX ADMIN — SENNA PLUS 2 TABLET(S): 8.6 TABLET ORAL at 22:24

## 2021-07-28 RX ADMIN — Medication 50 MILLIGRAM(S): at 06:08

## 2021-07-28 RX ADMIN — FAMOTIDINE 20 MILLIGRAM(S): 10 INJECTION INTRAVENOUS at 09:42

## 2021-07-28 RX ADMIN — Medication 50 MICROGRAM(S): at 06:08

## 2021-07-28 RX ADMIN — GABAPENTIN 600 MILLIGRAM(S): 400 CAPSULE ORAL at 22:24

## 2021-07-28 RX ADMIN — Medication 125 MILLILITER(S): at 17:33

## 2021-07-28 RX ADMIN — GABAPENTIN 400 MILLIGRAM(S): 400 CAPSULE ORAL at 06:08

## 2021-07-28 RX ADMIN — Medication 200 MILLIGRAM(S): at 09:43

## 2021-07-28 RX ADMIN — NICARDIPINE HYDROCHLORIDE 25 MG/HR: 30 CAPSULE, EXTENDED RELEASE ORAL at 13:38

## 2021-07-28 NOTE — H&P ADULT - NSHPPHYSICALEXAM_GEN_ALL_CORE
Appearance: No acute distress.  Neurologic: +Essential Tremor; AAOx3, no AMS or focal deficits.  Responds appropriately to verbal and physical stimuli; exhibits purposeful movement in all extremities.  HEENT:   MMM, PERRLA, EOMI	b/l  Neck: Supple,- JVD; - Carotid Bruit   Cardiovascular: RRR, S1/S2. No m/r/g.  Respiratory: No acute respiratory distress on RA. CTA b/l, no w/r/r.   Gastrointestinal:  Soft, non-tender, non-distended, + BS.	  Skin: No rashes. No ecchymoses. No cyanosis.  Extremities: Exhibits normal range of motion, no clubbing, cyanosis or edema.  Vascular: Peripheral pulses palpable 2+ bilaterally.

## 2021-07-28 NOTE — H&P ADULT - ASSESSMENT
89 year old male with history of Essential Tremor (on Propranolol), spinal stenosis, DVT s/p IVC filter (on Warfarin, last dose 7/26/21), cholecystectomy, TURP, s/p PPM who initially presented to urgent care with lower back pain, GI upset with poor PO intake and reported 12lb weight loss.  He reported to [facility] ED per their recommendation and underwent CTA Chest/Abdomen/Pelvis which revealed a 6.4cm descending aortic aneurysm with adjacent high density fluid/hematoma concerning for impending rupture. He was admitted for BP control and further evaluation, was referred to Dr. Fernandez for surgical evaluation and was transferred to [facility] on 7/28/21 to complete pre-surgical testing.     ==== Neurovascular ====  1. Hx of Essential Tremor  -Resume appropriate home medications  -No delirium, pain well managed on current regimen  -C/w PRNs for Pain control  -Monitor neuro status    ==== Respiratory ====  -Saturates well on RA     -AM CXR stable, repeat in AM  -Encourage IS 10x/hour while awake, Cough and deep breathing exercises  -Monitor respiratory status via SpO2    ==== Cardiovascular ====  1. Type B Aortic Dissection  -CT imaging reviewed by Dr. Fernandez   -Admitted to [facility] CTICU to complete pre-surgical testing  -Strict BP control per ICU protocols  -Monitor HR/BP/Tele    2. Hx of IVC filter  -Will hold home AC in the setting of Type B Dissection    3. s/p PPM  -Unknown brand    ==== GI ====   -Tolerating PO  -Prophylaxis: Protonix  -C/w bowel regimen    ==== /Renal ====  -BUN/Cr: pending   -Trend Cr on AM labs  -Replete electrolytes as needed    ==== ID ====   Afebrile, asymptomatic  -WBC: pending   -Continue to monitor for SIRS/Sepsis syndrome while inpatient    ==== Endocrine ====   -A1C: pending   -TSH: pending     ==== Hematologic ====   -H/H: pending   -CBC, chem in AM  -DVT ppx: SCDs, will discuss DVT ppx, hx of IVC filter     ==== Disposition Planning ====  Admit to CTICU for pre-op testing.

## 2021-07-28 NOTE — H&P ADULT - NSHPSOCIALHISTORY_GEN_ALL_CORE
SOCIAL HISTORY:  Smoker: Former (smoked 1/2 PPD x 20 years, quit 65 years ago)  ETOH use:  NO   Ilicit Drug use: NO  Occupation: Retired   Assisted device use (Cane / Walker): Cane and walker  Live with: Wife

## 2021-07-28 NOTE — DISCHARGE NOTE PROVIDER - HOSPITAL COURSE
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 Patient has occasional back pain and lower abdominal discomfort recently related to diarrhea. Patient has had nausea and loss of appetite recently with poor po intake and 12 pound weight loss. Patient presented to [facility] on 7/7 with these complaints and states "one of the cat scan machines was broken and I didn't want to wait 5 hours for the scan" and therefore [facility] staff told him to follow up with his PCP for an outpatient CT which revealed the aortic findings mentioned and he was referred back to [facility]. Patient is relatively healthy with h/o GB removal, TURP, ambulates with a walker, limited activity due to spinal stenosis, h/o PPM, and DVT many years ago s/p IVC filter. Denies any current chest or back pain or history of hypertension.    7/29  HD stable.  /63 On labetolol 200Q8.  Stable for transfer to [place].  Currently pain free.  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 Patient has occasional back pain and lower abdominal discomfort recently related to diarrhea. Patient has had nausea and loss of appetite recently with poor po intake and 12 pound weight loss. Patient presented to [facility] on 7/7 with these complaints and states "one of the cat scan machines was broken and I didn't want to wait 5 hours for the scan" and therefore [facility] staff told him to follow up with his PCP for an outpatient CT which revealed the aortic findings mentioned and he was referred back to [facility]. Patient is relatively healthy with h/o GB removal, TURP, ambulates with a walker, limited activity due to spinal stenosis, h/o PPM, and DVT many years ago s/p IVC filter. Denies any current chest or back pain or history of hypertension.    7/28  HD stable.  /63 On labetolol 200 Q8.  Stable for transfer to [place].  Currently pain free.  Slight dizziness on standing orthostatics done lying down -125/58  sitting-120/71  standing- 99/51

## 2021-07-28 NOTE — H&P ADULT - NSHPLABSRESULTS_GEN_ALL_CORE
< from: CT Angio Chest PE Protocol w/ IV Cont (07.26.21 @ 19:40) >    IMPRESSION:  At least 6.4 cm descending thoracic aortic aneurysm is unchanged since recent CT 7/23/2021. Heterogeneous high density fluid/hematoma is noted adjacent to or possibly within an irregular enlarged aneurysm sac, concerning for impending aortic rupture.    Additional 5.7 cm abdominal aortic and smaller saccular aortic aneurysms, unchanged.    Unchanged small left pleural effusion.    These findings were discussed by Dr. Barron with Dr. Guy at 9:15 PM 07/26/2021.      < end of copied text >

## 2021-07-28 NOTE — DISCHARGE NOTE PROVIDER - CARE PROVIDERS DIRECT ADDRESSES
[email or web address]

## 2021-07-28 NOTE — H&P ADULT - NSHPREVIEWOFSYSTEMS_GEN_ALL_CORE
Review of Systems  CONSTITUTIONAL:  Denies Fevers / chills, sweats, fatigue, weight loss, weight gain                                      NEURO:  Denies paresthesia, seizures, syncope, confusion                                                                                EYES:  Denies Blurry vision, discharge, pain, loss of vision                                                                                    ENMT:  Denies Difficulty hearing, vertigo, dysphagia, epistaxis, recent dental work                                       CV:  Denies Chest pain, palpitations, LEI, orthopnea                                                                                          RESPIRATORY:  Denies Wheezing, SOB, cough / sputum, hemoptysis                                                                GI:  Denies Nausea, vomiting, diarrhea, constipation, melena, difficulty swallowing                                               : Denies Hematuria, dysuria, urgency, incontinence                                                                                         MUSCULOSKELETAL:  Denies arthritis, joint swelling, muscle weakness                                                             SKIN/BREAST:  Denies rash, itching, hair loss, masses                                                                                            PSYCH:  Denies depression, anxiety, suicidal ideation                                                                                               HEME/LYMPH:  Denies bruises easily, enlarged lymph nodes, tender lymph nodes                                        ENDOCRINE:  Denies cold intolerance, heat intolerance, polydipsia

## 2021-07-28 NOTE — PROGRESS NOTE ADULT - SUBJECTIVE AND OBJECTIVE BOX
CTICU  CRITICAL  CARE  attending     Hand off received 					   Pertinent clinical, laboratory, radiographic, hemodynamic, echocardiographic, respiratory data, microbiologic data and chart were reviewed and analyzed frequently throughout the course of the day and night  Patient seen and examined with CTS/ SH attending at bedside  Pt is a 89y , Male, HEALTH ISSUES - PROBLEM Dx:      , FAMILY HISTORY:  No pertinent family history in first degree relatives    PAST MEDICAL & SURGICAL HISTORY:  DVT of Lower Extremity (Deep Venous Thrombosis)  s/p green field filter placement    Syncope and Collapse    Deviated Septum    Spinal stenosis of lumbar region  s/p spinal surgery in     Peripheral arterial disease  PAD of lower extremities    Hyperlipidemia    Tremor  intentional tremors of hands    S/P Cholecystectomy    H/O Pacemaker  last replaced in 2011.    H/O  TURP (Transurethral Resection of Prostate)    S/P tonsillectomy  in 1932      Patient is a 89y old  Male who presents with a chief complaint of Type B Aortic Dissection (2021 13:13)      14 system review limited by mentation and multiorgan morbidity     Vital signs, hemodynamic and respiratory parameters were reviewed from the bedside nursing flowsheet.  ICU Vital Signs Last 24 Hrs  T(C): 36.7 (2021 13:00), Max: 36.7 (2021 13:00)  T(F): 98.1 (2021 13:00), Max: 98.1 (2021 13:00)  HR: 57 (2021 16:00) (54 - 62)  BP: 124/58 (2021 11:50) (109/59 - 124/58)  BP(mean): 83 (2021 11:50) (80 - 87)  ABP: 117/53 (2021 16:00) (105/48 - 147/65)  ABP(mean): 75 (2021 16:00) (69 - 96)  RR: 16 (2021 13:00) (16 - 18)  SpO2: 99% (2021 16:00) (97% - 100%)    Adult Advanced Hemodynamics Last 24 Hrs  CVP(mm Hg): --  CVP(cm H2O): --  CO: --  CI: --  PA: --  PA(mean): --  PCWP: --  SVR: --  SVRI: --  PVR: --  PVRI: --, ABG - ( 2021 13:31 )  pH, Arterial: 7.36  pH, Blood: x     /  pCO2: 36    /  pO2: 116   / HCO3: 20    / Base Excess: -4.5  /  SaO2: 99.5                Intake and output was reviewed and the fluid balance was calculated  Daily     Daily Weight in k.4 (2021 13:00)  I&O's Summary      All lines and drain sites were assessed  Glycemic trend was reviewedCAPILLARY BLOOD GLUCOSE        No acute change in focality  Auscultation of the chest reveals equal bs  Abdomen is soft  Extremities are warm and well perfused  Wounds appear clean and unremarkable  Antibiotics are periop    labs  CBC Full  -  ( 2021 13:29 )  WBC Count : 4.34 K/uL  RBC Count : 3.94 M/uL  Hemoglobin : 11.3 g/dL  Hematocrit : 36.3 %  Platelet Count - Automated : 86 K/uL  Mean Cell Volume : 92.1 fl  Mean Cell Hemoglobin : 28.7 pg  Mean Cell Hemoglobin Concentration : 31.1 gm/dL  Auto Neutrophil # : 3.31 K/uL  Auto Lymphocyte # : 0.57 K/uL  Auto Monocyte # : 0.31 K/uL  Auto Eosinophil # : 0.12 K/uL  Auto Basophil # : 0.01 K/uL  Auto Neutrophil % : 76.3 %  Auto Lymphocyte % : 13.1 %  Auto Monocyte % : 7.1 %  Auto Eosinophil % : 2.8 %  Auto Basophil % : 0.2 %        144  |  113<H>  |  33<H>  ----------------------------<  143<H>  4.0   |  22  |  1.56<H>    Ca    9.2      2021 13:29  Phos  2.2     07-26  Mg     2.2     07-26    TPro  5.4<L>  /  Alb  3.5  /  TBili  0.4  /  DBili  x   /  AST  16  /  ALT  17  /  AlkPhos  67  07-28    PT/INR - ( 2021 13:29 )   PT: 21.9 sec;   INR: 1.88          PTT - ( 2021 13:29 )  PTT:30.2 sec  The current medications were reviewed   MEDICATIONS  (STANDING):  atorvastatin 10 milliGRAM(s) Oral at bedtime  gabapentin 400 milliGRAM(s) Oral two times a day  gabapentin 600 milliGRAM(s) Oral at bedtime  labetalol 200 milliGRAM(s) Oral every 8 hours  levothyroxine 50 MICROGram(s) Oral daily  niCARdipine Infusion 5 mG/Hr (25 mL/Hr) IV Continuous <Continuous>  pantoprazole    Tablet 40 milliGRAM(s) Oral before breakfast  senna 2 Tablet(s) Oral at bedtime  sodium chloride 0.9% lock flush 3 milliLiter(s) IV Push every 8 hours  topiramate 50 milliGRAM(s) Oral daily    MEDICATIONS  (PRN):  acetaminophen   Tablet .. 650 milliGRAM(s) Oral every 6 hours PRN Temp greater or equal to 38C (100.4F), Mild Pain (1 - 3)       PROBLEM LIST/ ASSESSMENT:  HEALTH ISSUES - PROBLEM Dx:      ,   Patient is a 89y old  Male who presents with a chief complaint of Type B Aortic Dissection (2021 13:13)     preop for cardiac surgery                 My plan includes :  close hemodynamic, ventilatory and drain monitoring and management per post op routine    Monitor for arrhythmias and monitor parameters for organ perfusion  beta blockade not administered due to hemodynamic instability and bradycardia  monitor neurologic status  Head of the bed should remain elevated to 45 deg .   chest PT and IS will be encouraged  monitor adequacy of oxygenation and ventilation and attempt to wean oxygen  antibiotic regimen will be tailored to the clinical, laboratory and microbiologic data  Nutritional goals will be met using po eventually , ensure adequate caloric intake and montior the same  Stress ulcer and VTE prophylaxis will be achieved    Glycemic control is satisfactory  Electrolytes have been repleted as necessary and wound care has been carried out. Pain control has been achieved.   agressive physical therapy and early mobility and ambulation goals will be met   The family was updated about the course and plan  CRITICAL CARE TIME personally provided by me  in evaluation and management, reassessments, review and interpretation of labs and x-rays, ventilator and hemodynamic management, formulating a plan and coordinating care: ___90____ MIN.  Time does not include procedural time. Time spent was non routine post-operarive caRE and included multiple and repeated evaluations at the bedside  CTICU ATTENDING     					    Angel Luis Bay MD

## 2021-07-28 NOTE — DISCHARGE NOTE PROVIDER - CARE PROVIDER_API CALL
Teddy Fernandez (MD)  Surgery; Thoracic and Cardiac Surgery  [address] [place] [address]  Phone: [phone]  Fax: [phone]  Follow Up Time:

## 2021-07-28 NOTE — DISCHARGE NOTE PROVIDER - NSDCCPCAREPLAN_GEN_ALL_CORE_FT
PRINCIPAL DISCHARGE DIAGNOSIS  Diagnosis: Aortic aneurysm, descending  Assessment and Plan of Treatment: BP control  Transfer to [place] for TEVAR with Dr Fernandez

## 2021-07-28 NOTE — H&P ADULT - HISTORY OF PRESENT ILLNESS
This is a 89 year old male with history of Essential Tremor (on Propranolol), spinal stenosis, DVT s/p IVC filter (on Warfarin, last dose 7/26/21), cholecystectomy, TURP, s/p PPM who initially presented to urgent care with lower back pain, GI upset with poor PO intake and reported 12lb weight loss.  He reported to [facility] ED per their recommendation and underwent CTA Chest/Abdomen/Pelvis which revealed a 6.4cm descending aortic aneurysm with adjacent high density fluid/hematoma concerning for impending rupture. He was admitted for BP control and further evaluation, was referred to Dr. Fernandez for surgical evaluation and was transferred to [facility] on 7/28/21 to complete pre-surgical testing. Denies HA, AMS, CP, palpitations, SOB, cough, hemoptysis, n/v/d, fever.

## 2021-07-28 NOTE — DISCHARGE NOTE PROVIDER - NSDCMRMEDTOKEN_GEN_ALL_CORE_FT
atorvastatin 10 mg oral tablet: 1 tab(s) orally once a day (at bedtime)  famotidine 20 mg oral tablet: 1 tab(s) orally once a day  gabapentin 400 mg oral capsule: 1 cap(s) orally 2 times a day  gabapentin 600 mg oral tablet: 1 tab(s) orally once a day (at bedtime)  labetalol 200 mg oral tablet: 1 tab(s) orally every 8 hours  levothyroxine 50 mcg (0.05 mg) oral tablet: 1 tab(s) orally once a day  senna oral tablet: 2 tab(s) orally once a day (at bedtime)  topiramate 50 mg oral tablet: 1 tab(s) orally

## 2021-07-28 NOTE — DISCHARGE NOTE PROVIDER - NSDCPNSUBOBJ_GEN_ALL_CORE
General: WN/WD NAD  Neurology: A&Ox3, nonfocal, LARSEN x 4  Respiratory: CTA B/L  CV: RRR, S1S2, no murmur  Abdominal: Soft, NT, ND no palpable mass  MSK: No edema, + peripheral pulses, FROM all 4 extremity    Vital Signs Last 24 Hrs  T(C): 36.4 (28 Jul 2021 07:00), Max: 36.6 (28 Jul 2021 03:02)  T(F): 97.6 (28 Jul 2021 07:00), Max: 97.8 (28 Jul 2021 03:02)  HR: 56 (28 Jul 2021 07:00) (54 - 61)  BP: 113/66 (28 Jul 2021 07:00) (100/56 - 113/69)  BP(mean): 85 (28 Jul 2021 07:00) (73 - 87)  RR: 16 (28 Jul 2021 07:00) (16 - 21)  SpO2: 98% (28 Jul 2021 07:00) (96% - 100%)

## 2021-07-28 NOTE — DISCHARGE NOTE NURSING/CASE MANAGEMENT/SOCIAL WORK - PATIENT PORTAL LINK FT
You can access the FollowMyHealth Patient Portal offered by [facility] by registering at the following website: [email or web address]. By joining [facility]’s FollowMyHealth portal, you will also be able to view your health information using other applications (apps) compatible with our system.

## 2021-07-29 ENCOUNTER — TRANSCRIPTION ENCOUNTER (OUTPATIENT)
Age: 86
End: 2021-07-29

## 2021-07-29 LAB
ALBUMIN SERPL ELPH-MCNC: 3.6 G/DL — SIGNIFICANT CHANGE UP (ref 3.3–5)
ALP SERPL-CCNC: 57 U/L — SIGNIFICANT CHANGE UP (ref 40–120)
ALT FLD-CCNC: 13 U/L — SIGNIFICANT CHANGE UP (ref 10–45)
ANION GAP SERPL CALC-SCNC: 9 MMOL/L — SIGNIFICANT CHANGE UP (ref 5–17)
APTT BLD: 29.5 SEC — SIGNIFICANT CHANGE UP (ref 27.5–35.5)
AST SERPL-CCNC: 14 U/L — SIGNIFICANT CHANGE UP (ref 10–40)
BILIRUB SERPL-MCNC: 0.5 MG/DL — SIGNIFICANT CHANGE UP (ref 0.2–1.2)
BUN SERPL-MCNC: 37 MG/DL — HIGH (ref 7–23)
CALCIUM SERPL-MCNC: 9.1 MG/DL — SIGNIFICANT CHANGE UP (ref 8.4–10.5)
CHLORIDE SERPL-SCNC: 113 MMOL/L — HIGH (ref 96–108)
CO2 SERPL-SCNC: 20 MMOL/L — LOW (ref 22–31)
CREAT SERPL-MCNC: 1.53 MG/DL — HIGH (ref 0.5–1.3)
GAS PNL BLDA: SIGNIFICANT CHANGE UP
GLUCOSE SERPL-MCNC: 108 MG/DL — HIGH (ref 70–99)
HCT VFR BLD CALC: 33.7 % — LOW (ref 39–50)
HGB BLD-MCNC: 10.9 G/DL — LOW (ref 13–17)
INR BLD: 1.67 — HIGH (ref 0.88–1.16)
MAGNESIUM SERPL-MCNC: 2 MG/DL — SIGNIFICANT CHANGE UP (ref 1.6–2.6)
MCHC RBC-ENTMCNC: 29.5 PG — SIGNIFICANT CHANGE UP (ref 27–34)
MCHC RBC-ENTMCNC: 32.3 GM/DL — SIGNIFICANT CHANGE UP (ref 32–36)
MCV RBC AUTO: 91.1 FL — SIGNIFICANT CHANGE UP (ref 80–100)
NRBC # BLD: 0 /100 WBCS — SIGNIFICANT CHANGE UP (ref 0–0)
PHOSPHATE SERPL-MCNC: 2.9 MG/DL — SIGNIFICANT CHANGE UP (ref 2.5–4.5)
PLATELET # BLD AUTO: 77 K/UL — LOW (ref 150–400)
POTASSIUM SERPL-MCNC: 3.9 MMOL/L — SIGNIFICANT CHANGE UP (ref 3.5–5.3)
POTASSIUM SERPL-SCNC: 3.9 MMOL/L — SIGNIFICANT CHANGE UP (ref 3.5–5.3)
PROT SERPL-MCNC: 5.5 G/DL — LOW (ref 6–8.3)
PROTHROM AB SERPL-ACNC: 19.6 SEC — HIGH (ref 10.6–13.6)
RBC # BLD: 3.7 M/UL — LOW (ref 4.2–5.8)
RBC # FLD: 14.6 % — HIGH (ref 10.3–14.5)
SODIUM SERPL-SCNC: 142 MMOL/L — SIGNIFICANT CHANGE UP (ref 135–145)
WBC # BLD: 3.99 K/UL — SIGNIFICANT CHANGE UP (ref 3.8–10.5)
WBC # FLD AUTO: 3.99 K/UL — SIGNIFICANT CHANGE UP (ref 3.8–10.5)

## 2021-07-29 PROCEDURE — 99291 CRITICAL CARE FIRST HOUR: CPT

## 2021-07-29 PROCEDURE — 51703 INSERT BLADDER CATH COMPLEX: CPT

## 2021-07-29 PROCEDURE — 71045 X-RAY EXAM CHEST 1 VIEW: CPT | Mod: 26

## 2021-07-29 PROCEDURE — 99292 CRITICAL CARE ADDL 30 MIN: CPT

## 2021-07-29 RX ORDER — CHLORHEXIDINE GLUCONATE 213 G/1000ML
1 SOLUTION TOPICAL ONCE
Refills: 0 | Status: COMPLETED | OUTPATIENT
Start: 2021-07-29 | End: 2021-07-29

## 2021-07-29 RX ORDER — CHLORHEXIDINE GLUCONATE 213 G/1000ML
10 SOLUTION TOPICAL ONCE
Refills: 0 | Status: COMPLETED | OUTPATIENT
Start: 2021-07-29 | End: 2021-08-01

## 2021-07-29 RX ADMIN — Medication 50 MICROGRAM(S): at 06:44

## 2021-07-29 RX ADMIN — GABAPENTIN 400 MILLIGRAM(S): 400 CAPSULE ORAL at 06:44

## 2021-07-29 RX ADMIN — SODIUM CHLORIDE 3 MILLILITER(S): 9 INJECTION INTRAMUSCULAR; INTRAVENOUS; SUBCUTANEOUS at 06:40

## 2021-07-29 RX ADMIN — Medication 50 MILLIGRAM(S): at 00:04

## 2021-07-29 RX ADMIN — SODIUM CHLORIDE 3 MILLILITER(S): 9 INJECTION INTRAMUSCULAR; INTRAVENOUS; SUBCUTANEOUS at 14:05

## 2021-07-29 RX ADMIN — SODIUM CHLORIDE 3 MILLILITER(S): 9 INJECTION INTRAMUSCULAR; INTRAVENOUS; SUBCUTANEOUS at 21:49

## 2021-07-29 RX ADMIN — PANTOPRAZOLE SODIUM 40 MILLIGRAM(S): 20 TABLET, DELAYED RELEASE ORAL at 06:44

## 2021-07-29 RX ADMIN — GABAPENTIN 600 MILLIGRAM(S): 400 CAPSULE ORAL at 21:49

## 2021-07-29 RX ADMIN — ATORVASTATIN CALCIUM 10 MILLIGRAM(S): 80 TABLET, FILM COATED ORAL at 21:46

## 2021-07-29 RX ADMIN — CHLORHEXIDINE GLUCONATE 1 APPLICATION(S): 213 SOLUTION TOPICAL at 22:09

## 2021-07-29 NOTE — PROCEDURE NOTE - NSINFORMCONSENT_GEN_A_CORE
Benefits, risks, and possible complications of procedure explained to patient/caregiver who verbalized understanding and gave verbal consent.
[facility],  5

## 2021-07-29 NOTE — PROCEDURE NOTE - ADDITIONAL PROCEDURE DETAILS
not a formal consult   14fr placed   TOV as per primary team when patient ambulatory and with bowel function

## 2021-07-29 NOTE — PROGRESS NOTE ADULT - SUBJECTIVE AND OBJECTIVE BOX
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    H/O  TURP (Transurethral Resection of Prostate)    S/P tonsillectomy  in 1932      Patient is a 89y old  Male who presents with a chief complaint of Type B Aortic Dissection (28 Jul 2021 16:24)      14 system review limited by mentation and multiorgan morbidity     Vital signs, hemodynamic and respiratory parameters were reviewed from the bedside nursing flowsheet.  ICU Vital Signs Last 24 Hrs  T(C): 36.2 (30 Jul 2021 09:00), Max: 36.5 (29 Jul 2021 21:15)  T(F): 97.2 (30 Jul 2021 09:00), Max: 97.7 (29 Jul 2021 21:15)  HR: 63 (30 Jul 2021 10:22) (54 - 66)  BP: 128/50 (30 Jul 2021 06:26) (128/50 - 128/50)  BP(mean): --  ABP: 130/51 (30 Jul 2021 10:22) (116/55 - 157/65)  ABP(mean): 81 (30 Jul 2021 10:22) (75 - 100)  RR: 20 (30 Jul 2021 10:22) (16 - 20)  SpO2: 96% (30 Jul 2021 10:22) (95% - 100%)    Adult Advanced Hemodynamics Last 24 Hrs  CVP(mm Hg): --  CVP(cm H2O): --  CO: --  CI: --  PA: --  PA(mean): --  PCWP: --  SVR: --  SVRI: --  PVR: --  PVRI: --, ABG - ( 30 Jul 2021 02:09 )  pH, Arterial: 7.37  pH, Blood: x     /  pCO2: 35    /  pO2: 104   / HCO3: 20    / Base Excess: -4.4  /  SaO2: 99.8                Intake and output was reviewed and the fluid balance was calculated  Daily Height in cm: 187.96 (30 Jul 2021 06:26)    Daily   I&O's Summary    29 Jul 2021 07:01  -  30 Jul 2021 07:00  --------------------------------------------------------  IN: 627.5 mL / OUT: 1540 mL / NET: -912.5 mL    30 Jul 2021 07:01  -  30 Jul 2021 11:47  --------------------------------------------------------  IN: 25 mL / OUT: 300 mL / NET: -275 mL        All lines and drain sites were assessed  Glycemic trend was reviewedCAPILLARY BLOOD GLUCOSE        No acute change in focality  Auscultation of the chest reveals equal bs  Abdomen is soft  Extremities are warm and well perfused  Wounds appear clean and unremarkable  Antibiotics are periop    labs  CBC Full  -  ( 30 Jul 2021 02:09 )  WBC Count : 4.18 K/uL  RBC Count : 3.79 M/uL  Hemoglobin : 11.4 g/dL  Hematocrit : 35.2 %  Platelet Count - Automated : 71 K/uL  Mean Cell Volume : 92.9 fl  Mean Cell Hemoglobin : 30.1 pg  Mean Cell Hemoglobin Concentration : 32.4 gm/dL  Auto Neutrophil # : x  Auto Lymphocyte # : x  Auto Monocyte # : x  Auto Eosinophil # : x  Auto Basophil # : x  Auto Neutrophil % : x  Auto Lymphocyte % : x  Auto Monocyte % : x  Auto Eosinophil % : x  Auto Basophil % : x    07-30    143  |  113<H>  |  35<H>  ----------------------------<  111<H>  3.9   |  22  |  1.51<H>    Ca    8.9      30 Jul 2021 02:09  Phos  3.0     07-30  Mg     2.0     07-30    TPro  5.6<L>  /  Alb  3.6  /  TBili  0.5  /  DBili  x   /  AST  15  /  ALT  14  /  AlkPhos  60  07-30    PT/INR - ( 30 Jul 2021 02:09 )   PT: 15.6 sec;   INR: 1.32          PTT - ( 30 Jul 2021 02:09 )  PTT:27.1 sec  The current medications were reviewed   MEDICATIONS  (STANDING):  atorvastatin 10 milliGRAM(s) Oral at bedtime  chlorhexidine 0.12% Liquid 10 milliLiter(s) Swish and Spit once  chlorhexidine 4% Liquid 1 Application(s) Topical once  gabapentin 400 milliGRAM(s) Oral two times a day  gabapentin 600 milliGRAM(s) Oral at bedtime  levothyroxine 50 MICROGram(s) Oral daily  niCARdipine Infusion 5 mG/Hr (25 mL/Hr) IV Continuous <Continuous>  pantoprazole    Tablet 40 milliGRAM(s) Oral before breakfast  senna 2 Tablet(s) Oral at bedtime  sodium chloride 0.9% lock flush 3 milliLiter(s) IV Push every 8 hours  topiramate 50 milliGRAM(s) Oral daily    MEDICATIONS  (PRN):  acetaminophen   Tablet .. 650 milliGRAM(s) Oral every 6 hours PRN Temp greater or equal to 38C (100.4F), Mild Pain (1 - 3)       PROBLEM LIST/ ASSESSMENT:  HEALTH ISSUES - PROBLEM Dx:      ,   Patient is a 89y old  Male who presents with a chief complaint of Type B Aortic Dissection (28 Jul 2021 16:24)     preop for tevar                 My plan includes :  close hemodynamic, ventilatory and drain monitoring and management per post op routine   control of bp to prevent aneurysmal worsening while ensuring permissive htn as needed   Monitor for arrhythmias and monitor parameters for organ perfusion    lacy attempted for operative need    monitor neurologic status  Head of the bed should remain elevated to 45 deg .   chest PT and IS will be encouraged  monitor adequacy of oxygenation and ventilation and attempt to wean oxygen  antibiotic regimen will be tailored to the clinical, laboratory and microbiologic data  Nutritional goals will be met using po eventually , ensure adequate caloric intake and montior the same  Stress ulcer and VTE prophylaxis will be achieved    Glycemic control is satisfactory  Electrolytes have been repleted as necessary and wound care has been carried out. Pain control has been achieved.   agressive physical therapy and early mobility and ambulation goals will be met   The family was updated about the course and plan  CRITICAL CARE TIME personally provided by me  in evaluation and management, reassessments, review and interpretation of labs and x-rays, ventilator and hemodynamic management, formulating a plan and coordinating care: ___90____ MIN.  Time does not include procedural time. Time spent was non routine post-operarive caRE and included multiple and repeated evaluations at the bedside  CTICU ATTENDING     					    Angel Luis Bay MD

## 2021-07-30 ENCOUNTER — APPOINTMENT (OUTPATIENT)
Dept: CARDIOTHORACIC SURGERY | Facility: HOSPITAL | Age: 86
End: 2021-07-30

## 2021-07-30 LAB
ALBUMIN SERPL ELPH-MCNC: 3.2 G/DL — LOW (ref 3.3–5)
ALBUMIN SERPL ELPH-MCNC: 3.4 G/DL — SIGNIFICANT CHANGE UP (ref 3.3–5)
ALBUMIN SERPL ELPH-MCNC: 3.6 G/DL — SIGNIFICANT CHANGE UP (ref 3.3–5)
ALP SERPL-CCNC: 60 U/L — SIGNIFICANT CHANGE UP (ref 40–120)
ALP SERPL-CCNC: 64 U/L — SIGNIFICANT CHANGE UP (ref 40–120)
ALP SERPL-CCNC: 67 U/L — SIGNIFICANT CHANGE UP (ref 40–120)
ALT FLD-CCNC: 13 U/L — SIGNIFICANT CHANGE UP (ref 10–45)
ALT FLD-CCNC: 14 U/L — SIGNIFICANT CHANGE UP (ref 10–45)
ALT FLD-CCNC: 14 U/L — SIGNIFICANT CHANGE UP (ref 10–45)
ANION GAP SERPL CALC-SCNC: 7 MMOL/L — SIGNIFICANT CHANGE UP (ref 5–17)
ANION GAP SERPL CALC-SCNC: 8 MMOL/L — SIGNIFICANT CHANGE UP (ref 5–17)
ANION GAP SERPL CALC-SCNC: 8 MMOL/L — SIGNIFICANT CHANGE UP (ref 5–17)
APTT BLD: 27.1 SEC — LOW (ref 27.5–35.5)
APTT BLD: 29.2 SEC — SIGNIFICANT CHANGE UP (ref 27.5–35.5)
APTT BLD: 30.5 SEC — SIGNIFICANT CHANGE UP (ref 27.5–35.5)
AST SERPL-CCNC: 15 U/L — SIGNIFICANT CHANGE UP (ref 10–40)
AST SERPL-CCNC: 16 U/L — SIGNIFICANT CHANGE UP (ref 10–40)
AST SERPL-CCNC: 16 U/L — SIGNIFICANT CHANGE UP (ref 10–40)
BASOPHILS # BLD AUTO: 0.01 K/UL — SIGNIFICANT CHANGE UP (ref 0–0.2)
BASOPHILS NFR BLD AUTO: 0.2 % — SIGNIFICANT CHANGE UP (ref 0–2)
BILIRUB SERPL-MCNC: 0.5 MG/DL — SIGNIFICANT CHANGE UP (ref 0.2–1.2)
BILIRUB SERPL-MCNC: 0.5 MG/DL — SIGNIFICANT CHANGE UP (ref 0.2–1.2)
BILIRUB SERPL-MCNC: 0.7 MG/DL — SIGNIFICANT CHANGE UP (ref 0.2–1.2)
BLD GP AB SCN SERPL QL: NEGATIVE — SIGNIFICANT CHANGE UP
BUN SERPL-MCNC: 24 MG/DL — HIGH (ref 7–23)
BUN SERPL-MCNC: 28 MG/DL — HIGH (ref 7–23)
BUN SERPL-MCNC: 35 MG/DL — HIGH (ref 7–23)
CALCIUM SERPL-MCNC: 8.7 MG/DL — SIGNIFICANT CHANGE UP (ref 8.4–10.5)
CALCIUM SERPL-MCNC: 8.7 MG/DL — SIGNIFICANT CHANGE UP (ref 8.4–10.5)
CALCIUM SERPL-MCNC: 8.9 MG/DL — SIGNIFICANT CHANGE UP (ref 8.4–10.5)
CHLORIDE SERPL-SCNC: 112 MMOL/L — HIGH (ref 96–108)
CHLORIDE SERPL-SCNC: 113 MMOL/L — HIGH (ref 96–108)
CHLORIDE SERPL-SCNC: 113 MMOL/L — HIGH (ref 96–108)
CO2 SERPL-SCNC: 21 MMOL/L — LOW (ref 22–31)
CO2 SERPL-SCNC: 21 MMOL/L — LOW (ref 22–31)
CO2 SERPL-SCNC: 22 MMOL/L — SIGNIFICANT CHANGE UP (ref 22–31)
CREAT SERPL-MCNC: 1.34 MG/DL — HIGH (ref 0.5–1.3)
CREAT SERPL-MCNC: 1.38 MG/DL — HIGH (ref 0.5–1.3)
CREAT SERPL-MCNC: 1.51 MG/DL — HIGH (ref 0.5–1.3)
EOSINOPHIL # BLD AUTO: 0.12 K/UL — SIGNIFICANT CHANGE UP (ref 0–0.5)
EOSINOPHIL NFR BLD AUTO: 3 % — SIGNIFICANT CHANGE UP (ref 0–6)
GAS PNL BLDA: SIGNIFICANT CHANGE UP
GAS PNL BLDA: SIGNIFICANT CHANGE UP
GLUCOSE SERPL-MCNC: 111 MG/DL — HIGH (ref 70–99)
GLUCOSE SERPL-MCNC: 123 MG/DL — HIGH (ref 70–99)
GLUCOSE SERPL-MCNC: 154 MG/DL — HIGH (ref 70–99)
HCT VFR BLD CALC: 34.9 % — LOW (ref 39–50)
HCT VFR BLD CALC: 35.2 % — LOW (ref 39–50)
HCT VFR BLD CALC: 35.3 % — LOW (ref 39–50)
HGB BLD-MCNC: 11.4 G/DL — LOW (ref 13–17)
IMM GRANULOCYTES NFR BLD AUTO: 1 % — SIGNIFICANT CHANGE UP (ref 0–1.5)
INR BLD: 1.29 — HIGH (ref 0.88–1.16)
INR BLD: 1.32 — HIGH (ref 0.88–1.16)
INR BLD: 1.41 — HIGH (ref 0.88–1.16)
LYMPHOCYTES # BLD AUTO: 0.57 K/UL — LOW (ref 1–3.3)
LYMPHOCYTES # BLD AUTO: 14.2 % — SIGNIFICANT CHANGE UP (ref 13–44)
MAGNESIUM SERPL-MCNC: 2 MG/DL — SIGNIFICANT CHANGE UP (ref 1.6–2.6)
MAGNESIUM SERPL-MCNC: 2 MG/DL — SIGNIFICANT CHANGE UP (ref 1.6–2.6)
MCHC RBC-ENTMCNC: 29.8 PG — SIGNIFICANT CHANGE UP (ref 27–34)
MCHC RBC-ENTMCNC: 30 PG — SIGNIFICANT CHANGE UP (ref 27–34)
MCHC RBC-ENTMCNC: 30.1 PG — SIGNIFICANT CHANGE UP (ref 27–34)
MCHC RBC-ENTMCNC: 32.3 GM/DL — SIGNIFICANT CHANGE UP (ref 32–36)
MCHC RBC-ENTMCNC: 32.4 GM/DL — SIGNIFICANT CHANGE UP (ref 32–36)
MCHC RBC-ENTMCNC: 32.7 GM/DL — SIGNIFICANT CHANGE UP (ref 32–36)
MCV RBC AUTO: 91.8 FL — SIGNIFICANT CHANGE UP (ref 80–100)
MCV RBC AUTO: 92.4 FL — SIGNIFICANT CHANGE UP (ref 80–100)
MCV RBC AUTO: 92.9 FL — SIGNIFICANT CHANGE UP (ref 80–100)
MONOCYTES # BLD AUTO: 0.18 K/UL — SIGNIFICANT CHANGE UP (ref 0–0.9)
MONOCYTES NFR BLD AUTO: 4.5 % — SIGNIFICANT CHANGE UP (ref 2–14)
NEUTROPHILS # BLD AUTO: 3.1 K/UL — SIGNIFICANT CHANGE UP (ref 1.8–7.4)
NEUTROPHILS NFR BLD AUTO: 77.1 % — HIGH (ref 43–77)
NRBC # BLD: 0 /100 WBCS — SIGNIFICANT CHANGE UP (ref 0–0)
PHOSPHATE SERPL-MCNC: 3 MG/DL — SIGNIFICANT CHANGE UP (ref 2.5–4.5)
PLATELET # BLD AUTO: 61 K/UL — LOW (ref 150–400)
PLATELET # BLD AUTO: 65 K/UL — LOW (ref 150–400)
PLATELET # BLD AUTO: 71 K/UL — LOW (ref 150–400)
POTASSIUM SERPL-MCNC: 3.9 MMOL/L — SIGNIFICANT CHANGE UP (ref 3.5–5.3)
POTASSIUM SERPL-MCNC: 3.9 MMOL/L — SIGNIFICANT CHANGE UP (ref 3.5–5.3)
POTASSIUM SERPL-MCNC: 4.3 MMOL/L — SIGNIFICANT CHANGE UP (ref 3.5–5.3)
POTASSIUM SERPL-SCNC: 3.9 MMOL/L — SIGNIFICANT CHANGE UP (ref 3.5–5.3)
POTASSIUM SERPL-SCNC: 3.9 MMOL/L — SIGNIFICANT CHANGE UP (ref 3.5–5.3)
POTASSIUM SERPL-SCNC: 4.3 MMOL/L — SIGNIFICANT CHANGE UP (ref 3.5–5.3)
PROT SERPL-MCNC: 4.9 G/DL — LOW (ref 6–8.3)
PROT SERPL-MCNC: 5.1 G/DL — LOW (ref 6–8.3)
PROT SERPL-MCNC: 5.6 G/DL — LOW (ref 6–8.3)
PROTHROM AB SERPL-ACNC: 15.3 SEC — HIGH (ref 10.6–13.6)
PROTHROM AB SERPL-ACNC: 15.6 SEC — HIGH (ref 10.6–13.6)
PROTHROM AB SERPL-ACNC: 16.7 SEC — HIGH (ref 10.6–13.6)
RBC # BLD: 3.79 M/UL — LOW (ref 4.2–5.8)
RBC # BLD: 3.8 M/UL — LOW (ref 4.2–5.8)
RBC # BLD: 3.82 M/UL — LOW (ref 4.2–5.8)
RBC # FLD: 14.6 % — HIGH (ref 10.3–14.5)
RBC # FLD: 14.6 % — HIGH (ref 10.3–14.5)
RBC # FLD: 14.7 % — HIGH (ref 10.3–14.5)
RH IG SCN BLD-IMP: POSITIVE — SIGNIFICANT CHANGE UP
SODIUM SERPL-SCNC: 140 MMOL/L — SIGNIFICANT CHANGE UP (ref 135–145)
SODIUM SERPL-SCNC: 142 MMOL/L — SIGNIFICANT CHANGE UP (ref 135–145)
SODIUM SERPL-SCNC: 143 MMOL/L — SIGNIFICANT CHANGE UP (ref 135–145)
WBC # BLD: 4.02 K/UL — SIGNIFICANT CHANGE UP (ref 3.8–10.5)
WBC # BLD: 4.18 K/UL — SIGNIFICANT CHANGE UP (ref 3.8–10.5)
WBC # BLD: 4.39 K/UL — SIGNIFICANT CHANGE UP (ref 3.8–10.5)
WBC # FLD AUTO: 4.02 K/UL — SIGNIFICANT CHANGE UP (ref 3.8–10.5)
WBC # FLD AUTO: 4.18 K/UL — SIGNIFICANT CHANGE UP (ref 3.8–10.5)
WBC # FLD AUTO: 4.39 K/UL — SIGNIFICANT CHANGE UP (ref 3.8–10.5)

## 2021-07-30 PROCEDURE — 71045 X-RAY EXAM CHEST 1 VIEW: CPT | Mod: 26,76

## 2021-07-30 PROCEDURE — 36200 PLACE CATHETER IN AORTA: CPT | Mod: 50,59

## 2021-07-30 PROCEDURE — 34713 PERQ ACCESS & CLSR FEM ART: CPT | Mod: 62,RT

## 2021-07-30 PROCEDURE — 99292 CRITICAL CARE ADDL 30 MIN: CPT

## 2021-07-30 PROCEDURE — 33881 EVASC RPR TA NDGFT XCOV LSA: CPT | Mod: 62

## 2021-07-30 PROCEDURE — 93010 ELECTROCARDIOGRAM REPORT: CPT

## 2021-07-30 PROCEDURE — 37252 INTRVASC US NONCORONARY 1ST: CPT

## 2021-07-30 PROCEDURE — 75957: CPT | Mod: 26,80

## 2021-07-30 PROCEDURE — 99291 CRITICAL CARE FIRST HOUR: CPT

## 2021-07-30 PROCEDURE — 75957: CPT | Mod: 26

## 2021-07-30 RX ORDER — ACETAMINOPHEN 500 MG
1000 TABLET ORAL ONCE
Refills: 0 | Status: COMPLETED | OUTPATIENT
Start: 2021-07-30 | End: 2021-07-30

## 2021-07-30 RX ORDER — PANTOPRAZOLE SODIUM 20 MG/1
40 TABLET, DELAYED RELEASE ORAL DAILY
Refills: 0 | Status: DISCONTINUED | OUTPATIENT
Start: 2021-07-30 | End: 2021-07-30

## 2021-07-30 RX ORDER — CEFAZOLIN SODIUM 1 G
2000 VIAL (EA) INJECTION EVERY 8 HOURS
Refills: 0 | Status: COMPLETED | OUTPATIENT
Start: 2021-07-30 | End: 2021-08-01

## 2021-07-30 RX ORDER — BENZOCAINE AND MENTHOL 5; 1 G/100ML; G/100ML
1 LIQUID ORAL
Refills: 0 | Status: DISCONTINUED | OUTPATIENT
Start: 2021-07-30 | End: 2021-08-04

## 2021-07-30 RX ORDER — MEPERIDINE HYDROCHLORIDE 50 MG/ML
25 INJECTION INTRAMUSCULAR; INTRAVENOUS; SUBCUTANEOUS ONCE
Refills: 0 | Status: DISCONTINUED | OUTPATIENT
Start: 2021-07-30 | End: 2021-07-30

## 2021-07-30 RX ORDER — BENZOCAINE AND MENTHOL 5; 1 G/100ML; G/100ML
1 LIQUID ORAL THREE TIMES A DAY
Refills: 0 | Status: DISCONTINUED | OUTPATIENT
Start: 2021-07-30 | End: 2021-07-30

## 2021-07-30 RX ORDER — CHLORHEXIDINE GLUCONATE 213 G/1000ML
5 SOLUTION TOPICAL
Refills: 0 | Status: DISCONTINUED | OUTPATIENT
Start: 2021-07-30 | End: 2021-08-04

## 2021-07-30 RX ORDER — SODIUM CHLORIDE 9 MG/ML
1000 INJECTION INTRAMUSCULAR; INTRAVENOUS; SUBCUTANEOUS
Refills: 0 | Status: DISCONTINUED | OUTPATIENT
Start: 2021-07-30 | End: 2021-08-04

## 2021-07-30 RX ADMIN — GABAPENTIN 400 MILLIGRAM(S): 400 CAPSULE ORAL at 17:49

## 2021-07-30 RX ADMIN — SODIUM CHLORIDE 3 MILLILITER(S): 9 INJECTION INTRAMUSCULAR; INTRAVENOUS; SUBCUTANEOUS at 21:17

## 2021-07-30 RX ADMIN — BENZOCAINE AND MENTHOL 1 LOZENGE: 5; 1 LIQUID ORAL at 21:52

## 2021-07-30 RX ADMIN — SODIUM CHLORIDE 3 MILLILITER(S): 9 INJECTION INTRAMUSCULAR; INTRAVENOUS; SUBCUTANEOUS at 05:59

## 2021-07-30 RX ADMIN — SENNA PLUS 2 TABLET(S): 8.6 TABLET ORAL at 21:52

## 2021-07-30 RX ADMIN — PANTOPRAZOLE SODIUM 40 MILLIGRAM(S): 20 TABLET, DELAYED RELEASE ORAL at 05:59

## 2021-07-30 RX ADMIN — Medication 400 MILLIGRAM(S): at 15:05

## 2021-07-30 RX ADMIN — Medication 1000 MILLIGRAM(S): at 15:30

## 2021-07-30 RX ADMIN — GABAPENTIN 600 MILLIGRAM(S): 400 CAPSULE ORAL at 21:52

## 2021-07-30 RX ADMIN — ATORVASTATIN CALCIUM 10 MILLIGRAM(S): 80 TABLET, FILM COATED ORAL at 21:52

## 2021-07-30 RX ADMIN — GABAPENTIN 400 MILLIGRAM(S): 400 CAPSULE ORAL at 05:59

## 2021-07-30 RX ADMIN — BENZOCAINE AND MENTHOL 1 LOZENGE: 5; 1 LIQUID ORAL at 17:49

## 2021-07-30 RX ADMIN — Medication 50 MILLIGRAM(S): at 17:48

## 2021-07-30 RX ADMIN — Medication 50 MICROGRAM(S): at 05:59

## 2021-07-30 RX ADMIN — Medication 100 MILLIGRAM(S): at 19:41

## 2021-07-30 RX ADMIN — SODIUM CHLORIDE 3 MILLILITER(S): 9 INJECTION INTRAMUSCULAR; INTRAVENOUS; SUBCUTANEOUS at 14:48

## 2021-07-30 NOTE — BRIEF OPERATIVE NOTE - OPERATION/FINDINGS
Procedure: TEVAR via right groin 26F access (closed with Proglide x3) and left 5F access.   Terumo Relay 40mm and 46mm grafts deployed in the mid descending thoracic aorta landing proximal to the celiac artery. No spinal drain in place. Patient extubated in the OR, palpable pulses and  5/5 leg strength and sensation.   Indication:  descending TAA
type B aortic aneurysm

## 2021-07-30 NOTE — PROGRESS NOTE ADULT - SUBJECTIVE AND OBJECTIVE BOX
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  He reported to [facility] ED per their recommendation and underwent CTA Chest/Abdomen/Pelvis which revealed a 6.4cm descending aortic aneurysm with adjacent high density fluid/hematoma concerning for impending rupture. He was admitted for BP control and further evaluation, was referred to Dr. Fernandez for surgical evaluation and was transferred to [facility] 7/28/21 to complete pre-surgical testing. Denies HA, AMS, CP, palpitations, SOB, cough, hemoptysis, n/v/d, fever.       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    H/O  TURP (Transurethral Resection of Prostate)    S/P tonsillectomy  in 1932      Patient is a 89y old  Male who presents with a chief complaint of Type B Aortic Dissection (30 Jul 2021 14:24)      14 system review limited 2/2 post procedure morbidity    Vital signs, hemodynamic and respiratory parameters were reviewed from the bedside nursing flowsheet.  ICU Vital Signs Last 24 Hrs  T(C): 35.5 (30 Jul 2021 20:59), Max: 36.3 (30 Jul 2021 05:12)  T(F): 95.9 (30 Jul 2021 20:59), Max: 97.4 (30 Jul 2021 05:12)  HR: 67 (30 Jul 2021 23:00) (54 - 67)  BP: 128/50 (30 Jul 2021 06:26) (128/50 - 128/50)  BP(mean): --  ABP: 163/62 (30 Jul 2021 23:00) (118/52 - 163/62)  ABP(mean): 97 (30 Jul 2021 23:00) (77 - 101)  RR: 16 (30 Jul 2021 23:00) (12 - 20)  SpO2: 100% (30 Jul 2021 23:00) (95% - 100%)    Adult Advanced Hemodynamics Last 24 Hrs  CVP(mm Hg): --  CVP(cm H2O): --  CO: --  CI: --  PA: --  PA(mean): --  PCWP: --  SVR: --  SVRI: --  PVR: --  PVRI: --, ABG - ( 30 Jul 2021 14:13 )  pH, Arterial: 7.34  pH, Blood: x     /  pCO2: 37    /  pO2: 99    / HCO3: 20    / Base Excess: -5.2  /  SaO2: 98.6                Intake and output was reviewed and the fluid balance was calculated  Daily Height in cm: 187.96 (30 Jul 2021 06:26)    Daily   I&O's Summary    29 Jul 2021 07:01  -  30 Jul 2021 07:00  --------------------------------------------------------  IN: 627.5 mL / OUT: 1540 mL / NET: -912.5 mL    30 Jul 2021 07:01  -  31 Jul 2021 00:08  --------------------------------------------------------  IN: 25 mL / OUT: 905 mL / NET: -880 mL        All lines and drain sites were assessed  Glycemic trend was reviewedCAPILLARY BLOOD GLUCOSE        No acute change in mental status  Auscultation of the chest reveals equal bs  Abdomen is soft  Extremities are warm and well perfused  Wounds appear clean and unremarkable  Antibiotics are periop    labs  CBC Full  -  ( 30 Jul 2021 20:56 )  WBC Count : 4.39 K/uL  RBC Count : 3.82 M/uL  Hemoglobin : 11.4 g/dL  Hematocrit : 35.3 %  Platelet Count - Automated : 61 K/uL  Mean Cell Volume : 92.4 fl  Mean Cell Hemoglobin : 29.8 pg  Mean Cell Hemoglobin Concentration : 32.3 gm/dL  Auto Neutrophil # : x  Auto Lymphocyte # : x  Auto Monocyte # : x  Auto Eosinophil # : x  Auto Basophil # : x  Auto Neutrophil % : x  Auto Lymphocyte % : x  Auto Monocyte % : x  Auto Eosinophil % : x  Auto Basophil % : x    07-30    140  |  112<H>  |  28<H>  ----------------------------<  154<H>  3.9   |  21<L>  |  1.38<H>    Ca    8.7      30 Jul 2021 20:56  Phos  3.0     07-30  Mg     2.0     07-30    TPro  5.1<L>  /  Alb  3.2<L>  /  TBili  0.5  /  DBili  x   /  AST  16  /  ALT  13  /  AlkPhos  67  07-30    PT/INR - ( 30 Jul 2021 20:56 )   PT: 15.3 sec;   INR: 1.29          PTT - ( 30 Jul 2021 20:56 )  PTT:29.2 sec  The current medications were reviewed   MEDICATIONS  (STANDING):  atorvastatin 10 milliGRAM(s) Oral at bedtime  ceFAZolin   IVPB 2000 milliGRAM(s) IV Intermittent every 8 hours  chlorhexidine 0.12% Liquid 5 milliLiter(s) Oral Mucosa two times a day  chlorhexidine 0.12% Liquid 10 milliLiter(s) Swish and Spit once  gabapentin 400 milliGRAM(s) Oral two times a day  gabapentin 600 milliGRAM(s) Oral at bedtime  levothyroxine 50 MICROGram(s) Oral daily  pantoprazole    Tablet 40 milliGRAM(s) Oral before breakfast  senna 2 Tablet(s) Oral at bedtime  sodium chloride 0.9% lock flush 3 milliLiter(s) IV Push every 8 hours  sodium chloride 0.9%. 1000 milliLiter(s) (10 mL/Hr) IV Continuous <Continuous>  topiramate 50 milliGRAM(s) Oral daily    MEDICATIONS  (PRN):  acetaminophen   Tablet .. 650 milliGRAM(s) Oral every 6 hours PRN Temp greater or equal to 38C (100.4F), Mild Pain (1 - 3)  benzocaine 15 mG/menthol 3.6 mG Lozenge 1 Lozenge Oral every 3 hours PRN Sore Throat       PROBLEM LIST/ ASSESSMENT:  HEALTH ISSUES - PROBLEM Dx:      ,   Patient is a 89y old  Male who presents with a chief complaint of Type B Aortic Dissection (30 Jul 2021 14:24)     s/p TEVAR      My plan includes :    maintain MAP >85-90  Serial LE motor checks to r/o motor deficit  Avoid nephrotoxic agents    close hemodynamic, ventilatory and drain monitoring and management per post op routine    Monitor for arrhythmias and monitor parameters for organ perfusion  monitor neurologic status  Head of the bed should remain elevated to 45 deg .   chest PT and IS will be encouraged  monitor adequacy of oxygenation and ventilation and attempt to wean oxygen  Nutritional goals will be met using po eventually , ensure adequate caloric intake and montior the same  Stress ulcer and VTE prophylaxis will be achieved    Glycemic control is satisfactory  Electrolytes have been repleted as necessary and wound care has been carried out. Pain control has been achieved.   agressive physical therapy and early mobility and ambulation goals will be met   The family was updated about the course and plan  CRITICAL CARE TIME SPENT in evaluation and management, reassessments, review and interpretation of labs and x-rays, ventilator and hemodynamic management, formulating a plan and coordinating care: ___90____ MIN.  Time does not include procedural time.  CTICU ATTENDING     					    Baldemar Wyatt MD

## 2021-07-30 NOTE — BRIEF OPERATIVE NOTE - NSICDXBRIEFPROCEDURE_GEN_ALL_CORE_FT
PROCEDURES:  Second stage thoracic endovascular aortic repair (TEVAR) 30-Jul-2021 14:13:34  Ramo Porter

## 2021-07-30 NOTE — PROGRESS NOTE ADULT - SUBJECTIVE AND OBJECTIVE BOX
Vascular Surgery Post-Op Note    Procedure: TEVAR    Diagnosis/Indication: Descending thoracic aortic dissection    Surgeon: Dr. Guajardo    S: Pt has no complaints. Denies CP, SOB, LEI, calf tenderness. Pain controlled with medication.    O:  T(C): 35.6 (07-30-21 @ 13:45), Max: 35.6 (07-30-21 @ 13:45)  T(F): 96 (07-30-21 @ 13:45), Max: 96 (07-30-21 @ 13:45)  HR: 61 (07-30-21 @ 14:00) (61 - 61)  BP: --  RR: 18 (07-30-21 @ 14:00) (18 - 18)  SpO2: 97% (07-30-21 @ 14:00) (97% - 97%)  Wt(kg): --                        11.4   4.02  )-----------( 65       ( 30 Jul 2021 14:12 )             34.9     07-30    143  |  113<H>  |  35<H>  ----------------------------<  111<H>  3.9   |  22  |  1.51<H>    Ca    8.9      30 Jul 2021 02:09  Phos  3.0     07-30  Mg     2.0     07-30    TPro  5.6<L>  /  Alb  3.6  /  TBili  0.5  /  DBili  x   /  AST  15  /  ALT  14  /  AlkPhos  60  07-30      Gen: NAD, resting comfortably in bed  C/V: NSR  Pulm: Nonlabored breathing, no respiratory distress  Abd: soft, NT/ND. Groin access site without hematoma/swelling/tenderness to palpation.  Extrem: WWP, no calf edema. DP and PT pulses are palpable bilaterally. 5/5 hip flexion bilaterally.      A/P: 89yMale s/p TEVAR for type B dissection.    Plan  1. Continue frequent neurovascular checks of the groin and lower extremities.   2. Care per ICU.

## 2021-07-30 NOTE — BRIEF OPERATIVE NOTE - COMMENTS
I was the first assistant for this case including but not limited to TEAVR along with Vascular attending, Dr. Donahue and resident Dr. Villa.

## 2021-07-31 LAB
ALBUMIN SERPL ELPH-MCNC: 3.6 G/DL — SIGNIFICANT CHANGE UP (ref 3.3–5)
ALBUMIN SERPL ELPH-MCNC: 4 G/DL — SIGNIFICANT CHANGE UP (ref 3.3–5)
ALP SERPL-CCNC: 59 U/L — SIGNIFICANT CHANGE UP (ref 40–120)
ALP SERPL-CCNC: 72 U/L — SIGNIFICANT CHANGE UP (ref 40–120)
ALT FLD-CCNC: 14 U/L — SIGNIFICANT CHANGE UP (ref 10–45)
ALT FLD-CCNC: 9 U/L — LOW (ref 10–45)
ANION GAP SERPL CALC-SCNC: 10 MMOL/L — SIGNIFICANT CHANGE UP (ref 5–17)
ANION GAP SERPL CALC-SCNC: 7 MMOL/L — SIGNIFICANT CHANGE UP (ref 5–17)
APTT BLD: 29.7 SEC — SIGNIFICANT CHANGE UP (ref 27.5–35.5)
AST SERPL-CCNC: 13 U/L — SIGNIFICANT CHANGE UP (ref 10–40)
AST SERPL-CCNC: 16 U/L — SIGNIFICANT CHANGE UP (ref 10–40)
BILIRUB SERPL-MCNC: 0.7 MG/DL — SIGNIFICANT CHANGE UP (ref 0.2–1.2)
BILIRUB SERPL-MCNC: 0.7 MG/DL — SIGNIFICANT CHANGE UP (ref 0.2–1.2)
BUN SERPL-MCNC: 27 MG/DL — HIGH (ref 7–23)
BUN SERPL-MCNC: 29 MG/DL — HIGH (ref 7–23)
CALCIUM SERPL-MCNC: 8.9 MG/DL — SIGNIFICANT CHANGE UP (ref 8.4–10.5)
CALCIUM SERPL-MCNC: 9.1 MG/DL — SIGNIFICANT CHANGE UP (ref 8.4–10.5)
CHLORIDE SERPL-SCNC: 109 MMOL/L — HIGH (ref 96–108)
CHLORIDE SERPL-SCNC: 109 MMOL/L — HIGH (ref 96–108)
CO2 SERPL-SCNC: 20 MMOL/L — LOW (ref 22–31)
CO2 SERPL-SCNC: 22 MMOL/L — SIGNIFICANT CHANGE UP (ref 22–31)
CREAT SERPL-MCNC: 1.31 MG/DL — HIGH (ref 0.5–1.3)
CREAT SERPL-MCNC: 1.48 MG/DL — HIGH (ref 0.5–1.3)
GLUCOSE SERPL-MCNC: 140 MG/DL — HIGH (ref 70–99)
GLUCOSE SERPL-MCNC: 179 MG/DL — HIGH (ref 70–99)
HCT VFR BLD CALC: 33.3 % — LOW (ref 39–50)
HCT VFR BLD CALC: 37.7 % — LOW (ref 39–50)
HGB BLD-MCNC: 10.6 G/DL — LOW (ref 13–17)
HGB BLD-MCNC: 12.2 G/DL — LOW (ref 13–17)
INR BLD: 1.31 — HIGH (ref 0.88–1.16)
MAGNESIUM SERPL-MCNC: 1.9 MG/DL — SIGNIFICANT CHANGE UP (ref 1.6–2.6)
MCHC RBC-ENTMCNC: 29.2 PG — SIGNIFICANT CHANGE UP (ref 27–34)
MCHC RBC-ENTMCNC: 29.5 PG — SIGNIFICANT CHANGE UP (ref 27–34)
MCHC RBC-ENTMCNC: 31.8 GM/DL — LOW (ref 32–36)
MCHC RBC-ENTMCNC: 32.4 GM/DL — SIGNIFICANT CHANGE UP (ref 32–36)
MCV RBC AUTO: 91.3 FL — SIGNIFICANT CHANGE UP (ref 80–100)
MCV RBC AUTO: 91.7 FL — SIGNIFICANT CHANGE UP (ref 80–100)
NRBC # BLD: 0 /100 WBCS — SIGNIFICANT CHANGE UP (ref 0–0)
NRBC # BLD: 0 /100 WBCS — SIGNIFICANT CHANGE UP (ref 0–0)
PLATELET # BLD AUTO: 69 K/UL — LOW (ref 150–400)
PLATELET # BLD AUTO: 78 K/UL — LOW (ref 150–400)
POTASSIUM SERPL-MCNC: 4.1 MMOL/L — SIGNIFICANT CHANGE UP (ref 3.5–5.3)
POTASSIUM SERPL-MCNC: 4.1 MMOL/L — SIGNIFICANT CHANGE UP (ref 3.5–5.3)
POTASSIUM SERPL-SCNC: 4.1 MMOL/L — SIGNIFICANT CHANGE UP (ref 3.5–5.3)
POTASSIUM SERPL-SCNC: 4.1 MMOL/L — SIGNIFICANT CHANGE UP (ref 3.5–5.3)
PROT SERPL-MCNC: 5.8 G/DL — LOW (ref 6–8.3)
PROT SERPL-MCNC: 5.9 G/DL — LOW (ref 6–8.3)
PROTHROM AB SERPL-ACNC: 15.5 SEC — HIGH (ref 10.6–13.6)
RBC # BLD: 3.63 M/UL — LOW (ref 4.2–5.8)
RBC # BLD: 4.13 M/UL — LOW (ref 4.2–5.8)
RBC # FLD: 14.6 % — HIGH (ref 10.3–14.5)
RBC # FLD: 14.7 % — HIGH (ref 10.3–14.5)
SODIUM SERPL-SCNC: 138 MMOL/L — SIGNIFICANT CHANGE UP (ref 135–145)
SODIUM SERPL-SCNC: 139 MMOL/L — SIGNIFICANT CHANGE UP (ref 135–145)
WBC # BLD: 5.97 K/UL — SIGNIFICANT CHANGE UP (ref 3.8–10.5)
WBC # BLD: 8.1 K/UL — SIGNIFICANT CHANGE UP (ref 3.8–10.5)
WBC # FLD AUTO: 5.97 K/UL — SIGNIFICANT CHANGE UP (ref 3.8–10.5)
WBC # FLD AUTO: 8.1 K/UL — SIGNIFICANT CHANGE UP (ref 3.8–10.5)

## 2021-07-31 PROCEDURE — 71045 X-RAY EXAM CHEST 1 VIEW: CPT | Mod: 26

## 2021-07-31 PROCEDURE — 99292 CRITICAL CARE ADDL 30 MIN: CPT

## 2021-07-31 PROCEDURE — 99291 CRITICAL CARE FIRST HOUR: CPT

## 2021-07-31 RX ORDER — ALBUMIN HUMAN 25 %
250 VIAL (ML) INTRAVENOUS ONCE
Refills: 0 | Status: COMPLETED | OUTPATIENT
Start: 2021-07-31 | End: 2021-07-31

## 2021-07-31 RX ORDER — MAGNESIUM SULFATE 500 MG/ML
2 VIAL (ML) INJECTION ONCE
Refills: 0 | Status: COMPLETED | OUTPATIENT
Start: 2021-07-31 | End: 2021-07-31

## 2021-07-31 RX ORDER — ALBUMIN HUMAN 25 %
250 VIAL (ML) INTRAVENOUS
Refills: 0 | Status: COMPLETED | OUTPATIENT
Start: 2021-07-31 | End: 2021-07-31

## 2021-07-31 RX ORDER — PHENYLEPHRINE HYDROCHLORIDE 10 MG/ML
0.3 INJECTION INTRAVENOUS
Qty: 40 | Refills: 0 | Status: DISCONTINUED | OUTPATIENT
Start: 2021-07-31 | End: 2021-07-31

## 2021-07-31 RX ORDER — LABETALOL HCL 100 MG
10 TABLET ORAL ONCE
Refills: 0 | Status: COMPLETED | OUTPATIENT
Start: 2021-07-31 | End: 2021-07-31

## 2021-07-31 RX ADMIN — Medication 100 MILLIGRAM(S): at 03:08

## 2021-07-31 RX ADMIN — GABAPENTIN 600 MILLIGRAM(S): 400 CAPSULE ORAL at 21:17

## 2021-07-31 RX ADMIN — Medication 125 MILLILITER(S): at 19:00

## 2021-07-31 RX ADMIN — Medication 650 MILLIGRAM(S): at 06:42

## 2021-07-31 RX ADMIN — Medication 100 MILLIGRAM(S): at 19:42

## 2021-07-31 RX ADMIN — Medication 100 MILLIGRAM(S): at 11:00

## 2021-07-31 RX ADMIN — Medication 50 GRAM(S): at 05:24

## 2021-07-31 RX ADMIN — BENZOCAINE AND MENTHOL 1 LOZENGE: 5; 1 LIQUID ORAL at 06:42

## 2021-07-31 RX ADMIN — PANTOPRAZOLE SODIUM 40 MILLIGRAM(S): 20 TABLET, DELAYED RELEASE ORAL at 06:42

## 2021-07-31 RX ADMIN — CHLORHEXIDINE GLUCONATE 5 MILLILITER(S): 213 SOLUTION TOPICAL at 06:41

## 2021-07-31 RX ADMIN — SODIUM CHLORIDE 3 MILLILITER(S): 9 INJECTION INTRAMUSCULAR; INTRAVENOUS; SUBCUTANEOUS at 05:36

## 2021-07-31 RX ADMIN — PHENYLEPHRINE HYDROCHLORIDE 11.6 MICROGRAM(S)/KG/MIN: 10 INJECTION INTRAVENOUS at 07:39

## 2021-07-31 RX ADMIN — Medication 650 MILLIGRAM(S): at 07:24

## 2021-07-31 RX ADMIN — Medication 650 MILLIGRAM(S): at 20:14

## 2021-07-31 RX ADMIN — SODIUM CHLORIDE 3 MILLILITER(S): 9 INJECTION INTRAMUSCULAR; INTRAVENOUS; SUBCUTANEOUS at 14:30

## 2021-07-31 RX ADMIN — Medication 2 MILLIGRAM(S): at 21:17

## 2021-07-31 RX ADMIN — Medication 125 MILLILITER(S): at 09:17

## 2021-07-31 RX ADMIN — Medication 10 MILLIGRAM(S): at 20:45

## 2021-07-31 RX ADMIN — Medication 650 MILLIGRAM(S): at 19:43

## 2021-07-31 RX ADMIN — Medication 50 MILLIGRAM(S): at 11:00

## 2021-07-31 RX ADMIN — GABAPENTIN 400 MILLIGRAM(S): 400 CAPSULE ORAL at 06:41

## 2021-07-31 RX ADMIN — Medication 125 MILLILITER(S): at 14:10

## 2021-07-31 RX ADMIN — CHLORHEXIDINE GLUCONATE 5 MILLILITER(S): 213 SOLUTION TOPICAL at 17:23

## 2021-07-31 RX ADMIN — Medication 50 MICROGRAM(S): at 06:41

## 2021-07-31 RX ADMIN — SODIUM CHLORIDE 3 MILLILITER(S): 9 INJECTION INTRAMUSCULAR; INTRAVENOUS; SUBCUTANEOUS at 21:18

## 2021-07-31 RX ADMIN — GABAPENTIN 400 MILLIGRAM(S): 400 CAPSULE ORAL at 17:23

## 2021-07-31 RX ADMIN — Medication 125 MILLILITER(S): at 07:39

## 2021-07-31 RX ADMIN — Medication 125 MILLILITER(S): at 10:14

## 2021-07-31 RX ADMIN — SENNA PLUS 2 TABLET(S): 8.6 TABLET ORAL at 21:16

## 2021-07-31 RX ADMIN — ATORVASTATIN CALCIUM 10 MILLIGRAM(S): 80 TABLET, FILM COATED ORAL at 21:16

## 2021-07-31 NOTE — PROGRESS NOTE ADULT - SUBJECTIVE AND OBJECTIVE BOX
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    H/O  TURP (Transurethral Resection of Prostate)    S/P tonsillectomy  in 1932      Patient is a 89y old  Male who presents with a chief complaint of Type B Aortic Dissection (31 Jul 2021 09:35)      14 system review limited by mentation and multiorgan morbidity     Vital signs, hemodynamic and respiratory parameters were reviewed from the bedside nursing flowsheet.  ICU Vital Signs Last 24 Hrs  T(C): 36.8 (31 Jul 2021 14:00), Max: 37.4 (31 Jul 2021 09:00)  T(F): 98.3 (31 Jul 2021 14:00), Max: 99.4 (31 Jul 2021 09:00)  HR: 83 (31 Jul 2021 14:00) (54 - 88)  BP: 113/50 (31 Jul 2021 14:00) (81/49 - 138/63)  BP(mean): 83 (31 Jul 2021 14:00) (61 - 90)  ABP: 138/57 (31 Jul 2021 12:00) (106/52 - 164/62)  ABP(mean): 83 (31 Jul 2021 12:00) (64 - 101)  RR: 18 (31 Jul 2021 14:00) (12 - 20)  SpO2: 98% (31 Jul 2021 14:00) (93% - 100%)    Adult Advanced Hemodynamics Last 24 Hrs  CVP(mm Hg): --  CVP(cm H2O): --  CO: --  CI: --  PA: --  PA(mean): --  PCWP: --  SVR: --  SVRI: --  PVR: --  PVRI: --, ABG - ( 30 Jul 2021 14:13 )  pH, Arterial: 7.34  pH, Blood: x     /  pCO2: 37    /  pO2: 99    / HCO3: 20    / Base Excess: -5.2  /  SaO2: 98.6                Intake and output was reviewed and the fluid balance was calculated  Daily     Daily   I&O's Summary    30 Jul 2021 07:01  -  31 Jul 2021 07:00  --------------------------------------------------------  IN: 1025 mL / OUT: 1470 mL / NET: -445 mL    31 Jul 2021 07:01  -  31 Jul 2021 15:09  --------------------------------------------------------  IN: 1007.8 mL / OUT: 390 mL / NET: 617.8 mL        All lines and drain sites were assessed  Glycemic trend was reviewedCAPILLARY BLOOD GLUCOSE        No acute change in focality  Auscultation of the chest reveals equal bs  Abdomen is soft  Extremities are warm and well perfused  Wounds appear clean and unremarkable  Antibiotics are periop    labs  CBC Full  -  ( 31 Jul 2021 11:16 )  WBC Count : 8.10 K/uL  RBC Count : 3.63 M/uL  Hemoglobin : 10.6 g/dL  Hematocrit : 33.3 %  Platelet Count - Automated : 78 K/uL  Mean Cell Volume : 91.7 fl  Mean Cell Hemoglobin : 29.2 pg  Mean Cell Hemoglobin Concentration : 31.8 gm/dL  Auto Neutrophil # : x  Auto Lymphocyte # : x  Auto Monocyte # : x  Auto Eosinophil # : x  Auto Basophil # : x  Auto Neutrophil % : x  Auto Lymphocyte % : x  Auto Monocyte % : x  Auto Eosinophil % : x  Auto Basophil % : x    07-31    139  |  109<H>  |  29<H>  ----------------------------<  179<H>  4.1   |  20<L>  |  1.48<H>    Ca    8.9      31 Jul 2021 11:16  Phos  3.0     07-30  Mg     1.9     07-31    TPro  5.9<L>  /  Alb  4.0  /  TBili  0.7  /  DBili  x   /  AST  13  /  ALT  9<L>  /  AlkPhos  59  07-31    PT/INR - ( 31 Jul 2021 02:33 )   PT: 15.5 sec;   INR: 1.31          PTT - ( 31 Jul 2021 11:16 )  PTT:29.7 sec  The current medications were reviewed   MEDICATIONS  (STANDING):  atorvastatin 10 milliGRAM(s) Oral at bedtime  ceFAZolin   IVPB 2000 milliGRAM(s) IV Intermittent every 8 hours  chlorhexidine 0.12% Liquid 5 milliLiter(s) Oral Mucosa two times a day  chlorhexidine 0.12% Liquid 10 milliLiter(s) Swish and Spit once  gabapentin 400 milliGRAM(s) Oral two times a day  gabapentin 600 milliGRAM(s) Oral at bedtime  levothyroxine 50 MICROGram(s) Oral daily  pantoprazole    Tablet 40 milliGRAM(s) Oral before breakfast  phenylephrine    Infusion 0.3 MICROgram(s)/kG/Min (11.6 mL/Hr) IV Continuous <Continuous>  senna 2 Tablet(s) Oral at bedtime  sodium chloride 0.9% lock flush 3 milliLiter(s) IV Push every 8 hours  sodium chloride 0.9%. 1000 milliLiter(s) (10 mL/Hr) IV Continuous <Continuous>  testosterone patch 2 mG/24 Hr(s) 2 milliGRAM(s) Transdermal daily  topiramate 50 milliGRAM(s) Oral daily    MEDICATIONS  (PRN):  acetaminophen   Tablet .. 650 milliGRAM(s) Oral every 6 hours PRN Temp greater or equal to 38C (100.4F), Mild Pain (1 - 3)  benzocaine 15 mG/menthol 3.6 mG Lozenge 1 Lozenge Oral every 3 hours PRN Sore Throat       PROBLEM LIST/ ASSESSMENT:  HEALTH ISSUES - PROBLEM Dx:      ,   Patient is a 89y old  Male who presents with a chief complaint of Type B Aortic Dissection (31 Jul 2021 09:35)     s/p tevar                My plan includes :  close hemodynamic, ventilatory and drain monitoring and management per post op routine    will dc art line    liberalize bp goals  Monitor for arrhythmias and monitor parameters for organ perfusion    monitor neurologic status  Head of the bed should remain elevated to 45 deg .   chest PT and IS will be encouraged  monitor adequacy of oxygenation and ventilation and attempt to wean oxygen  antibiotic regimen will be tailored to the clinical, laboratory and microbiologic data  Nutritional goals will be met using po eventually , ensure adequate caloric intake and montior the same  Stress ulcer and VTE prophylaxis will be achieved    Glycemic control is satisfactory  Electrolytes have been repleted as necessary and wound care has been carried out. Pain control has been achieved.   agressive physical therapy and early mobility and ambulation goals will be met   The family was updated about the course and plan  CRITICAL CARE TIME personally provided by me  in evaluation and management, reassessments, review and interpretation of labs and x-rays, ventilator and hemodynamic management, formulating a plan and coordinating care: ___90____ MIN.  Time does not include procedural time. Time spent was non routine post-operarive caRE and included multiple and repeated evaluations at the bedside  CTICU ATTENDING     					    Angel Luis Bay MD

## 2021-07-31 NOTE — PHYSICAL THERAPY INITIAL EVALUATION ADULT - DISCHARGE DISPOSITION, PT EVAL
TBD pending progress with mobility and ambulation ; mobility limited this session by orthostatic hypotension.

## 2021-07-31 NOTE — PHYSICAL THERAPY INITIAL EVALUATION ADULT - ADDITIONAL COMMENTS
ambulates with 3-wheeled rollator at baseline. Pt reports he has 3 adult children who can take turns coming to his condo and assist if needed.

## 2021-07-31 NOTE — PHYSICAL THERAPY INITIAL EVALUATION ADULT - PERTINENT HX OF CURRENT PROBLEM, REHAB EVAL
This is a 89 year old male with history of Essential Tremor (on Propranolol), spinal stenosis, DVT s/p IVC filter (on Warfarin, last dose 7/26/21), cholecystectomy, TURP, s/p PPM who initially presented to urgent care with lower back pain, GI upset with poor PO intake and reported 12lb weight loss.

## 2021-07-31 NOTE — PHYSICAL THERAPY INITIAL EVALUATION ADULT - LEVEL OF INDEPENDENCE: GAIT, REHAB EVAL
Anesthesia Type: 1% lidocaine with epinephrine and a 1:10 solution of 8.4% sodium bicarbonate Additional Anesthesia Volume In Cc (Will Not Render If 0): 6 Electrodesiccation Text: The wound bed was treated with electrodesiccation after the biopsy was performed. Detail Level: Detailed Bill [id number] For Specimen Handling/Conveyance To Laboratory?: no Hemostasis: Silver Nitrate Destruction After The Procedure: Yes Wound Care: Bacitracin Dressing: pressure dressing Type Of Destruction Used: Electrodesiccation Biopsy Type: H and E consent was obtained and risks were reviewed including but not limited to scarring, infection, bleeding, scabbing, incomplete removal, nerve damage and allergy to anesthesia. Anesthesia Volume In Cc: 0.5 Biopsy Method: Dermablade Notification Instructions: Patient will be notified of biopsy results. However, patient instructed to call the office if not contacted within 2 weeks. Post-Care Instructions: I reviewed with the patient in detail post-care instructions. Patient is to keep the biopsy site dry overnight, and then apply bacitracin twice daily until healed. Patient may apply hydrogen peroxide soaks to remove any crusting. Size Of Lesion In Cm: 0 Billing Type: Third-Party Bill 2/2 orthostatic hypotension, pt returned to seated in chair for safety with BP recovered/unable to perform

## 2021-07-31 NOTE — PHYSICAL THERAPY INITIAL EVALUATION ADULT - GENERAL OBSERVATIONS, REHAB EVAL
Pt received seated out of bed +tele +A-line +SCDs +lacy, cleared for PT by PABLO García, agreeable to PT Eval. Left as found no acute distress VSS +call PABLO schmitt present/aware.

## 2021-07-31 NOTE — PROGRESS NOTE ADULT - ASSESSMENT
A/P: 89yMale s/p TEVAR for type B dissection.    Plan  1. Continue frequent neurovascular checks of the groin and lower extremities.   2. Care per ICU.

## 2021-07-31 NOTE — PROGRESS NOTE ADULT - SUBJECTIVE AND OBJECTIVE BOX
Patient is a 89y old  Male who presents with a chief complaint of Type B Aortic Dissection (30 Jul 2021 23:07)  POD 1 TEVAR     O/N:       MEDICATIONS  (STANDING):  albumin human  5% IVPB 250 milliLiter(s) IV Intermittent every 1 hour  atorvastatin 10 milliGRAM(s) Oral at bedtime  ceFAZolin   IVPB 2000 milliGRAM(s) IV Intermittent every 8 hours  chlorhexidine 0.12% Liquid 5 milliLiter(s) Oral Mucosa two times a day  chlorhexidine 0.12% Liquid 10 milliLiter(s) Swish and Spit once  gabapentin 400 milliGRAM(s) Oral two times a day  gabapentin 600 milliGRAM(s) Oral at bedtime  levothyroxine 50 MICROGram(s) Oral daily  pantoprazole    Tablet 40 milliGRAM(s) Oral before breakfast  phenylephrine    Infusion 0.3 MICROgram(s)/kG/Min (11.6 mL/Hr) IV Continuous <Continuous>  senna 2 Tablet(s) Oral at bedtime  sodium chloride 0.9% lock flush 3 milliLiter(s) IV Push every 8 hours  sodium chloride 0.9%. 1000 milliLiter(s) (10 mL/Hr) IV Continuous <Continuous>  topiramate 50 milliGRAM(s) Oral daily    MEDICATIONS  (PRN):  acetaminophen   Tablet .. 650 milliGRAM(s) Oral every 6 hours PRN Temp greater or equal to 38C (100.4F), Mild Pain (1 - 3)  benzocaine 15 mG/menthol 3.6 mG Lozenge 1 Lozenge Oral every 3 hours PRN Sore Throat      Allergies    Lovenox (Hives; Flushing; Rash; Chills)    Intolerances          Vital Signs Last 24 Hrs  T(C): 36.9 (31 Jul 2021 05:34), Max: 36.9 (31 Jul 2021 05:34)  T(F): 98.4 (31 Jul 2021 05:34), Max: 98.4 (31 Jul 2021 05:34)  HR: 70 (31 Jul 2021 08:00) (54 - 74)  BP: --  BP(mean): --  RR: 14 (31 Jul 2021 08:00) (12 - 20)  SpO2: 98% (31 Jul 2021 08:00) (96% - 100%)  CAPILLARY BLOOD GLUCOSE          07-30 @ 07:01 - 07-31 @ 07:00  --------------------------------------------------------  IN: 1025 mL / OUT: 1470 mL / NET: -445 mL    07-31 @ 07:01 - 07-31 @ 09:35  --------------------------------------------------------  IN: 257.8 mL / OUT: 150 mL / NET: 107.8 mL        Physical Exam:    Daily     Daily   General:  Well appearing, NAD  CV:  RRR  Lungs:  nonlabored breathing  Abdomen:  Soft, non-tender, no distended  Extremities:    Vascular:  Neuro:  AAOx3    LABS:                        12.2   5.97  )-----------( 69       ( 31 Jul 2021 02:33 )             37.7     07-31    138  |  109<H>  |  27<H>  ----------------------------<  140<H>  4.1   |  22  |  1.31<H>    Ca    9.1      31 Jul 2021 02:33  Phos  3.0     07-30  Mg     1.9     07-31    TPro  5.8<L>  /  Alb  3.6  /  TBili  0.7  /  DBili  x   /  AST  16  /  ALT  14  /  AlkPhos  72  07-31    PT/INR - ( 31 Jul 2021 02:33 )   PT: 15.5 sec;   INR: 1.31          PTT - ( 30 Jul 2021 20:56 )  PTT:29.2 sec        ---------------------------------------------------------------------------  PLEASE CHECK WHEN PRESENT:     [  ] Heart Failure     [  ] Acute     [  ] Acute on Chronic     [  ] Chronic  -------------------------------------------------------------------     [  ]Diastolic [HFpEF]     [  ]Systolic [HFrEF]     [  ]Combined [HFpEF & HFrEF]     [  ] afib     [  ] hypertensive heart disease     [  ]Other:  -------------------------------------------------------------------  [ ] Respiratory failure  [ ] Acute cor pulmonale  [ ] Asthma/COPD Exacerbation  [ ] Pleural effusion  [ ] Aspiration pneumonia  -------------------------------------------------------------------  [  ]MILAGROS     [  ]ATN     [  ]Reneal Medullary Necrosis     [  ]Renal Cortical Necrosis     [  ]Other Pathological Lesions:    [  ]CKD 1  [  ]CKD 2  [  ]CKD 3  [  ]CKD 4  [  ]CKD 5  [  ]Other  -------------------------------------------------------------------  [  ]Diabetes  [  ] Diabetic PVD Ulcer  [  ] Neuropathic ulcer to DM  [  ] Diabetes with Nephropathy  [  ] Osteomyelitis due to diabetes  [ ] Hyperglycemia  [ ] Hypoglycemia  --------------------------------------------------------------------  [  ]Malnutrition: See Nutrition Note  [  ]Cachexia  [  ]Other:   [  ]Supplement Ordered:  [  ]Morbid Obesity (BMI >=40]  ---------------------------------------------------------------------  [ ] Sepsis/severe sepsis/septic shock  [ ] UTI  [ ] Pneumonia  -----------------------------------------------------------------------  [ ] Acidosis/alkalosis  [ ] Fluid overload  [ ] Hypokalemia  [ ] Hyperkalemia  [ ] Hypomagnesemia  [ ] Hypophosphatemia  [ ] Hyperphosphatemia  [ ] Hyponatremia  [ ] Hypernatremia  ------------------------------------------------------------------------  [ ] Acute blood loss anemia  [ ] Post op blood loss anemia  [ ] Iron deficiency anemia  [ ] Anemia due to chronic disease  [ ] Hypercoagulable state  [ ] Leukocytosis  ----------------------------------------------------------------------  [ ] Cerebral infarction  [ ] Transient ischemia attack  [ ] Encephalopathy                 Patient is a 89y old  Male who presents with a chief complaint of Type B Aortic Dissection (30 Jul 2021 23:07)  POD 1 TEVAR     O/N: No overnight events, OOB to chair this AM.  Denies any complaints.      MEDICATIONS  (STANDING):  albumin human  5% IVPB 250 milliLiter(s) IV Intermittent every 1 hour  atorvastatin 10 milliGRAM(s) Oral at bedtime  ceFAZolin   IVPB 2000 milliGRAM(s) IV Intermittent every 8 hours  chlorhexidine 0.12% Liquid 5 milliLiter(s) Oral Mucosa two times a day  chlorhexidine 0.12% Liquid 10 milliLiter(s) Swish and Spit once  gabapentin 400 milliGRAM(s) Oral two times a day  gabapentin 600 milliGRAM(s) Oral at bedtime  levothyroxine 50 MICROGram(s) Oral daily  pantoprazole    Tablet 40 milliGRAM(s) Oral before breakfast  phenylephrine    Infusion 0.3 MICROgram(s)/kG/Min (11.6 mL/Hr) IV Continuous <Continuous>  senna 2 Tablet(s) Oral at bedtime  sodium chloride 0.9% lock flush 3 milliLiter(s) IV Push every 8 hours  sodium chloride 0.9%. 1000 milliLiter(s) (10 mL/Hr) IV Continuous <Continuous>  topiramate 50 milliGRAM(s) Oral daily    MEDICATIONS  (PRN):  acetaminophen   Tablet .. 650 milliGRAM(s) Oral every 6 hours PRN Temp greater or equal to 38C (100.4F), Mild Pain (1 - 3)  benzocaine 15 mG/menthol 3.6 mG Lozenge 1 Lozenge Oral every 3 hours PRN Sore Throat      Allergies    Lovenox (Hives; Flushing; Rash; Chills)    Intolerances          Vital Signs Last 24 Hrs  T(C): 36.9 (31 Jul 2021 05:34), Max: 36.9 (31 Jul 2021 05:34)  T(F): 98.4 (31 Jul 2021 05:34), Max: 98.4 (31 Jul 2021 05:34)  HR: 70 (31 Jul 2021 08:00) (54 - 74)  BP: --  BP(mean): --  RR: 14 (31 Jul 2021 08:00) (12 - 20)  SpO2: 98% (31 Jul 2021 08:00) (96% - 100%)  CAPILLARY BLOOD GLUCOSE          07-30 @ 07:01  -  07-31 @ 07:00  --------------------------------------------------------  IN: 1025 mL / OUT: 1470 mL / NET: -445 mL    07-31 @ 07:01  -  07-31 @ 09:35  --------------------------------------------------------  IN: 257.8 mL / OUT: 150 mL / NET: 107.8 mL        Physical Exam:    Gen: NAD, resting comfortably in bed  C/V: NSR  Pulm: Nonlabored breathing, no respiratory distress  Abd: soft, NT/ND. Groin access site without hematoma/swelling/tenderness to palpation.  Extrem: WWP, no calf edema. DP and PT pulses are palpable bilaterally. 5/5 hip flexion bilaterally.    LABS:                        12.2   5.97  )-----------( 69       ( 31 Jul 2021 02:33 )             37.7     07-31    138  |  109<H>  |  27<H>  ----------------------------<  140<H>  4.1   |  22  |  1.31<H>    Ca    9.1      31 Jul 2021 02:33  Phos  3.0     07-30  Mg     1.9     07-31    TPro  5.8<L>  /  Alb  3.6  /  TBili  0.7  /  DBili  x   /  AST  16  /  ALT  14  /  AlkPhos  72  07-31    PT/INR - ( 31 Jul 2021 02:33 )   PT: 15.5 sec;   INR: 1.31          PTT - ( 30 Jul 2021 20:56 )  PTT:29.2 sec        ---------------------------------------------------------------------------  PLEASE CHECK WHEN PRESENT:     [  ] Heart Failure     [  ] Acute     [  ] Acute on Chronic     [  ] Chronic  -------------------------------------------------------------------     [  ]Diastolic [HFpEF]     [  ]Systolic [HFrEF]     [  ]Combined [HFpEF & HFrEF]     [  ] afib     [  ] hypertensive heart disease     [  ]Other:  -------------------------------------------------------------------  [ ] Respiratory failure  [ ] Acute cor pulmonale  [ ] Asthma/COPD Exacerbation  [ ] Pleural effusion  [ ] Aspiration pneumonia  -------------------------------------------------------------------  [  ]MILAGROS     [  ]ATN     [  ]Reneal Medullary Necrosis     [  ]Renal Cortical Necrosis     [  ]Other Pathological Lesions:    [  ]CKD 1  [  ]CKD 2  [  ]CKD 3  [  ]CKD 4  [  ]CKD 5  [  ]Other  -------------------------------------------------------------------  [  ]Diabetes  [  ] Diabetic PVD Ulcer  [  ] Neuropathic ulcer to DM  [  ] Diabetes with Nephropathy  [  ] Osteomyelitis due to diabetes  [ ] Hyperglycemia  [ ] Hypoglycemia  --------------------------------------------------------------------  [  ]Malnutrition: See Nutrition Note  [  ]Cachexia  [  ]Other:   [  ]Supplement Ordered:  [  ]Morbid Obesity (BMI >=40]  ---------------------------------------------------------------------  [ ] Sepsis/severe sepsis/septic shock  [ ] UTI  [ ] Pneumonia  -----------------------------------------------------------------------  [ ] Acidosis/alkalosis  [ ] Fluid overload  [ ] Hypokalemia  [ ] Hyperkalemia  [ ] Hypomagnesemia  [ ] Hypophosphatemia  [ ] Hyperphosphatemia  [ ] Hyponatremia  [ ] Hypernatremia  ------------------------------------------------------------------------  [ ] Acute blood loss anemia  [ ] Post op blood loss anemia  [ ] Iron deficiency anemia  [ ] Anemia due to chronic disease  [ ] Hypercoagulable state  [ ] Leukocytosis  ----------------------------------------------------------------------  [ ] Cerebral infarction  [ ] Transient ischemia attack  [ ] Encephalopathy

## 2021-08-01 LAB
ANION GAP SERPL CALC-SCNC: 10 MMOL/L — SIGNIFICANT CHANGE UP (ref 5–17)
APTT BLD: 29.5 SEC — SIGNIFICANT CHANGE UP (ref 27.5–35.5)
BUN SERPL-MCNC: 35 MG/DL — HIGH (ref 7–23)
CALCIUM SERPL-MCNC: 8.6 MG/DL — SIGNIFICANT CHANGE UP (ref 8.4–10.5)
CHLORIDE SERPL-SCNC: 109 MMOL/L — HIGH (ref 96–108)
CO2 SERPL-SCNC: 22 MMOL/L — SIGNIFICANT CHANGE UP (ref 22–31)
CREAT SERPL-MCNC: 1.65 MG/DL — HIGH (ref 0.5–1.3)
GLUCOSE SERPL-MCNC: 152 MG/DL — HIGH (ref 70–99)
HCT VFR BLD CALC: 29.9 % — LOW (ref 39–50)
HCT VFR BLD CALC: 35 % — LOW (ref 39–50)
HGB BLD-MCNC: 11.1 G/DL — LOW (ref 13–17)
HGB BLD-MCNC: 9.5 G/DL — LOW (ref 13–17)
INR BLD: 1.29 — HIGH (ref 0.88–1.16)
MAGNESIUM SERPL-MCNC: 2.1 MG/DL — SIGNIFICANT CHANGE UP (ref 1.6–2.6)
MCHC RBC-ENTMCNC: 29.1 PG — SIGNIFICANT CHANGE UP (ref 27–34)
MCHC RBC-ENTMCNC: 30 PG — SIGNIFICANT CHANGE UP (ref 27–34)
MCHC RBC-ENTMCNC: 31.7 GM/DL — LOW (ref 32–36)
MCHC RBC-ENTMCNC: 31.8 GM/DL — LOW (ref 32–36)
MCV RBC AUTO: 91.7 FL — SIGNIFICANT CHANGE UP (ref 80–100)
MCV RBC AUTO: 94.6 FL — SIGNIFICANT CHANGE UP (ref 80–100)
NRBC # BLD: 0 /100 WBCS — SIGNIFICANT CHANGE UP (ref 0–0)
NRBC # BLD: 0 /100 WBCS — SIGNIFICANT CHANGE UP (ref 0–0)
PLATELET # BLD AUTO: 52 K/UL — LOW (ref 150–400)
PLATELET # BLD AUTO: 56 K/UL — LOW (ref 150–400)
POTASSIUM SERPL-MCNC: 3.8 MMOL/L — SIGNIFICANT CHANGE UP (ref 3.5–5.3)
POTASSIUM SERPL-SCNC: 3.8 MMOL/L — SIGNIFICANT CHANGE UP (ref 3.5–5.3)
PROTHROM AB SERPL-ACNC: 15.3 SEC — HIGH (ref 10.6–13.6)
RBC # BLD: 3.26 M/UL — LOW (ref 4.2–5.8)
RBC # BLD: 3.7 M/UL — LOW (ref 4.2–5.8)
RBC # FLD: 14.6 % — HIGH (ref 10.3–14.5)
RBC # FLD: 14.8 % — HIGH (ref 10.3–14.5)
SODIUM SERPL-SCNC: 141 MMOL/L — SIGNIFICANT CHANGE UP (ref 135–145)
WBC # BLD: 7.17 K/UL — SIGNIFICANT CHANGE UP (ref 3.8–10.5)
WBC # BLD: 7.94 K/UL — SIGNIFICANT CHANGE UP (ref 3.8–10.5)
WBC # FLD AUTO: 7.17 K/UL — SIGNIFICANT CHANGE UP (ref 3.8–10.5)
WBC # FLD AUTO: 7.94 K/UL — SIGNIFICANT CHANGE UP (ref 3.8–10.5)

## 2021-08-01 PROCEDURE — 99291 CRITICAL CARE FIRST HOUR: CPT

## 2021-08-01 PROCEDURE — 71045 X-RAY EXAM CHEST 1 VIEW: CPT | Mod: 26

## 2021-08-01 RX ORDER — LABETALOL HCL 100 MG
10 TABLET ORAL ONCE
Refills: 0 | Status: COMPLETED | OUTPATIENT
Start: 2021-08-01 | End: 2021-08-01

## 2021-08-01 RX ORDER — MIDODRINE HYDROCHLORIDE 2.5 MG/1
2.5 TABLET ORAL THREE TIMES A DAY
Refills: 0 | Status: DISCONTINUED | OUTPATIENT
Start: 2021-08-01 | End: 2021-08-01

## 2021-08-01 RX ORDER — MIDODRINE HYDROCHLORIDE 2.5 MG/1
10 TABLET ORAL THREE TIMES A DAY
Refills: 0 | Status: DISCONTINUED | OUTPATIENT
Start: 2021-08-01 | End: 2021-08-04

## 2021-08-01 RX ORDER — LIDOCAINE HCL 20 MG/ML
5 VIAL (ML) INJECTION EVERY 4 HOURS
Refills: 0 | Status: COMPLETED | OUTPATIENT
Start: 2021-08-01 | End: 2021-08-01

## 2021-08-01 RX ADMIN — SODIUM CHLORIDE 3 MILLILITER(S): 9 INJECTION INTRAMUSCULAR; INTRAVENOUS; SUBCUTANEOUS at 20:38

## 2021-08-01 RX ADMIN — MIDODRINE HYDROCHLORIDE 10 MILLIGRAM(S): 2.5 TABLET ORAL at 18:47

## 2021-08-01 RX ADMIN — PANTOPRAZOLE SODIUM 40 MILLIGRAM(S): 20 TABLET, DELAYED RELEASE ORAL at 06:27

## 2021-08-01 RX ADMIN — GABAPENTIN 600 MILLIGRAM(S): 400 CAPSULE ORAL at 21:04

## 2021-08-01 RX ADMIN — Medication 2 MILLIGRAM(S): at 20:49

## 2021-08-01 RX ADMIN — CHLORHEXIDINE GLUCONATE 5 MILLILITER(S): 213 SOLUTION TOPICAL at 18:45

## 2021-08-01 RX ADMIN — SODIUM CHLORIDE 3 MILLILITER(S): 9 INJECTION INTRAMUSCULAR; INTRAVENOUS; SUBCUTANEOUS at 06:07

## 2021-08-01 RX ADMIN — Medication 5 MILLILITER(S): at 10:00

## 2021-08-01 RX ADMIN — MIDODRINE HYDROCHLORIDE 2.5 MILLIGRAM(S): 2.5 TABLET ORAL at 08:49

## 2021-08-01 RX ADMIN — SODIUM CHLORIDE 3 MILLILITER(S): 9 INJECTION INTRAMUSCULAR; INTRAVENOUS; SUBCUTANEOUS at 15:59

## 2021-08-01 RX ADMIN — Medication 2 MILLIGRAM(S): at 20:21

## 2021-08-01 RX ADMIN — GABAPENTIN 400 MILLIGRAM(S): 400 CAPSULE ORAL at 18:45

## 2021-08-01 RX ADMIN — Medication 10 MILLIGRAM(S): at 06:16

## 2021-08-01 RX ADMIN — MIDODRINE HYDROCHLORIDE 10 MILLIGRAM(S): 2.5 TABLET ORAL at 13:29

## 2021-08-01 RX ADMIN — Medication 2 MILLIGRAM(S): at 13:28

## 2021-08-01 RX ADMIN — CHLORHEXIDINE GLUCONATE 10 MILLILITER(S): 213 SOLUTION TOPICAL at 06:27

## 2021-08-01 RX ADMIN — Medication 100 MILLIGRAM(S): at 04:00

## 2021-08-01 RX ADMIN — SENNA PLUS 2 TABLET(S): 8.6 TABLET ORAL at 21:04

## 2021-08-01 RX ADMIN — GABAPENTIN 400 MILLIGRAM(S): 400 CAPSULE ORAL at 06:27

## 2021-08-01 RX ADMIN — ATORVASTATIN CALCIUM 10 MILLIGRAM(S): 80 TABLET, FILM COATED ORAL at 21:04

## 2021-08-01 RX ADMIN — Medication 50 MICROGRAM(S): at 06:27

## 2021-08-01 RX ADMIN — Medication 2 MILLIGRAM(S): at 06:18

## 2021-08-01 RX ADMIN — Medication 50 MILLIGRAM(S): at 13:28

## 2021-08-01 NOTE — CONSULT NOTE ADULT - ASSESSMENT
Pt. is a 89 year old M Hx. of TURP x 30 years agos/p TEVAR via right groin on 7/30 now in Urinary retention.

## 2021-08-01 NOTE — PROGRESS NOTE ADULT - SUBJECTIVE AND OBJECTIVE BOX
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  He initially presented to urgent care with lower back pain, GI upset with poor PO intake and reported 12lb weight loss.    He reported to [facility] 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  The aneurysmal part  has high density fluid/hematoma raising concerns for impending rupture.   He was admitted for BP control and further evaluation, was referred to Dr. Fernandez for surgical evaluation.  Denies HA, AMS, CP, palpitations, SOB, cough, hemoptysis, n/v/d, fever.    S/P TEVAR.        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    H/O  TURP (Transurethral Resection of Prostate)    S/P tonsillectomy  in 1932          14 system review was unremarkable    Vital signs, hemodynamic and respiratory parameters were reviewed from the bedside nursing flow sheet.  ICU Vital Signs Last 24 Hrs  T(C): 36.7 (01 Aug 2021 09:28), Max: 37 (31 Jul 2021 17:26)  T(F): 98 (01 Aug 2021 09:28), Max: 98.6 (31 Jul 2021 17:26)  HR: 78 (01 Aug 2021 11:00) (68 - 87)  BP: 174/75 (01 Aug 2021 11:00) (97/52 - 186/74)  BP(mean): 108 (01 Aug 2021 11:00) (72 - 108)  ABP: --  ABP(mean): --  RR: 18 (01 Aug 2021 11:00) (14 - 20)  SpO2: 97% (01 Aug 2021 11:00) (94% - 99%)    Adult Advanced Hemodynamics Last 24 Hrs  CVP(mm Hg): --  CVP(cm H2O): --  CO: --  CI: --  PA: --  PA(mean): --  PCWP: --  SVR: --  SVRI: --  PVR: --  PVRI: --, ABG - ( 30 Jul 2021 14:13 )  pH, Arterial: 7.34  pH, Blood: x     /  pCO2: 37    /  pO2: 99    / HCO3: 20    / Base Excess: -5.2  /  SaO2: 98.6                Intake and output was reviewed and the fluid balance was calculated  Daily     Daily   I&O's Summary    31 Jul 2021 07:01  -  01 Aug 2021 07:00  --------------------------------------------------------  IN: 1493.2 mL / OUT: 535 mL / NET: 958.2 mL    01 Aug 2021 07:01  -  01 Aug 2021 12:41  --------------------------------------------------------  IN: 140 mL / OUT: 600 mL / NET: -460 mL        All lines and drain sites were assessed    Neuro: Follows commands. Moves all 4 extremities.  Neck: No JVD.  CVS: S1, S2, No S3.  Lungs: Good air entry bilaterally.  Abd: Soft. No tenderness. + Bowel sounds.  Vascular: + DP/PT.  Extremities: No edema.  Lymphatic: Normal.  Skin: No abnormalities.      labs  CBC Full  -  ( 01 Aug 2021 06:55 )  WBC Count : 7.94 K/uL  RBC Count : 3.70 M/uL  Hemoglobin : 11.1 g/dL  Hematocrit : 35.0 %  Platelet Count - Automated : 52 K/uL  Mean Cell Volume : 94.6 fl  Mean Cell Hemoglobin : 30.0 pg  Mean Cell Hemoglobin Concentration : 31.7 gm/dL  Auto Neutrophil # : x  Auto Lymphocyte # : x  Auto Monocyte # : x  Auto Eosinophil # : x  Auto Basophil # : x  Auto Neutrophil % : x  Auto Lymphocyte % : x  Auto Monocyte % : x  Auto Eosinophil % : x  Auto Basophil % : x    08-01    141  |  109<H>  |  35<H>  ----------------------------<  152<H>  3.8   |  22  |  1.65<H>    Ca    8.6      01 Aug 2021 02:30  Mg     2.1     08-01    TPro  5.9<L>  /  Alb  4.0  /  TBili  0.7  /  DBili  x   /  AST  13  /  ALT  9<L>  /  AlkPhos  59  07-31    PT/INR - ( 01 Aug 2021 02:31 )   PT: 15.3 sec;   INR: 1.29          PTT - ( 01 Aug 2021 02:31 )  PTT:29.5 sec  The current medications were reviewed   MEDICATIONS  (STANDING):  atorvastatin 10 milliGRAM(s) Oral at bedtime  chlorhexidine 0.12% Liquid 5 milliLiter(s) Oral Mucosa two times a day  gabapentin 400 milliGRAM(s) Oral two times a day  gabapentin 600 milliGRAM(s) Oral at bedtime  levothyroxine 50 MICROGram(s) Oral daily  lidocaine 2% Jelly 5 milliLiter(s) IntraUrethral every 4 hours  midodrine. 10 milliGRAM(s) Oral three times a day  pantoprazole    Tablet 40 milliGRAM(s) Oral before breakfast  propranolol 20 milliGRAM(s) Oral at bedtime  senna 2 Tablet(s) Oral at bedtime  sodium chloride 0.9% lock flush 3 milliLiter(s) IV Push every 8 hours  sodium chloride 0.9%. 1000 milliLiter(s) (10 mL/Hr) IV Continuous <Continuous>  testosterone patch 2 mG/24 Hr(s) 2 milliGRAM(s) Transdermal daily  topiramate 50 milliGRAM(s) Oral daily    MEDICATIONS  (PRN):  acetaminophen   Tablet .. 650 milliGRAM(s) Oral every 6 hours PRN Temp greater or equal to 38C (100.4F), Mild Pain (1 - 3)  benzocaine 15 mG/menthol 3.6 mG Lozenge 1 Lozenge Oral every 3 hours PRN Sore Throat             Patient is a 89y old  Male with Type B Aortic Dissection   S/P TEVAR  Hemodynamically stable. Intermittent orthostasis.  Good oxygenation.  BUN/Cr: 35/1.65.  Fair urine out put.        My plan includes :  Thyroid replacement Rx.   Statin Rx.  Betablocker Rx.  Close hemodynamic, ventilatory and drain monitoring and management  Monitor for arrhythmias and monitor parameters for organ perfusion  Monitor neurologic status  Monitor renal function.  Head of the bed should remain elevated to 45 deg .   Chest PT and IS will be encouraged  Monitor adequacy of oxygenation and ventilation and attempt to wean oxygen  Nutritional goals will be met using po eventually , ensure adequate caloric intake and monitor the same  Stress ulcer and VTE prophylaxis will be achieved    Glycemic control is satisfactory  Electrolytes have been repleted as necessary and wound care has been carried out. Pain control has been achieved.   Aggressive physical therapy and early mobility and ambulation goals will be met   The family was updated about the course and plan  CRITICAL CARE TIME SPENT in evaluation and management, reassessments, review and interpretation of labs and x-rays, ventilator and hemodynamic management, formulating a plan and coordinating care: ___90____ MIN.  Time does not include procedural time.  CTICU ATTENDING     					    Beau Streeter MD

## 2021-08-01 NOTE — PROGRESS NOTE ADULT - SUBJECTIVE AND OBJECTIVE BOX
STATUS POST:  POD2 TEVAR     SUBJECTIVE: Patient seen and examined bedside by vascular team. Is OOBC. Reports that he has not been able to walk because his knees feel very weak. Reports that he has arthritis at baseline and ambulates with a knee brace. Denies any weakness of bilateral LE apart from knee weakness. Denies CP, SOB, leg pain, abdominal pain.    midodrine. 2.5 milliGRAM(s) Oral three times a day      Vital Signs Last 24 Hrs  T(C): 36.4 (01 Aug 2021 05:01), Max: 37.4 (31 Jul 2021 09:00)  T(F): 97.5 (01 Aug 2021 05:01), Max: 99.4 (31 Jul 2021 09:00)  HR: 72 (01 Aug 2021 08:00) (68 - 88)  BP: 123/59 (01 Aug 2021 08:00) (81/49 - 186/74)  BP(mean): 84 (01 Aug 2021 08:00) (61 - 108)  RR: 14 (01 Aug 2021 08:00) (14 - 20)  SpO2: 95% (01 Aug 2021 08:00) (93% - 99%)  I&O's Detail    31 Jul 2021 07:01  -  01 Aug 2021 07:00  --------------------------------------------------------  IN:    Albumin 5%  - 250 mL: 1250 mL    IV PiggyBack: 100 mL    Oral Fluid: 120 mL    Phenylephrine: 23.2 mL  Total IN: 1493.2 mL    OUT:    Indwelling Catheter - Urethral (mL): 485 mL    Voided (mL): 50 mL  Total OUT: 535 mL    Total NET: 958.2 mL          Physical Exam:  General: No acute distress, resting comfortably in bed  C/V: normal sinus rhythm  Pulm: Nonlabored breathing, no respiratory distress  Abd: soft, non-tender, non-distended.  Extrem: warm and well perfused, no edema. Groin access sites soft, nontender, without hematoma.  Palpable DP/PT bilaterally. 5/5 hip flexion bilaterally. 5/5 plantarflexion/dorsiflexion bilaterally. Sensation intact bilaterally.    LABS:                        11.1   7.94  )-----------( 52       ( 01 Aug 2021 06:55 )             35.0     08-01    141  |  109<H>  |  35<H>  ----------------------------<  152<H>  3.8   |  22  |  1.65<H>    Ca    8.6      01 Aug 2021 02:30  Mg     2.1     08-01    TPro  5.9<L>  /  Alb  4.0  /  TBili  0.7  /  DBili  x   /  AST  13  /  ALT  9<L>  /  AlkPhos  59  07-31    PT/INR - ( 01 Aug 2021 02:31 )   PT: 15.3 sec;   INR: 1.29          PTT - ( 01 Aug 2021 02:31 )  PTT:29.5 sec      RADIOLOGY & ADDITIONAL STUDIES:    A/P: 89yMale POD2 from TEVAR for type B dissection.    Plan  1. Continue frequent neurovascular checks of the groin and lower extremities.   2. Recommend MAP > 90 (patient reporting knee weakness c/b known history of arthritis and chronic knee pain).  3. Care per ICU, vascular will continue to follow.

## 2021-08-01 NOTE — CONSULT NOTE ADULT - PROBLEM SELECTOR RECOMMENDATION 9
14f Granda placed with 600ml clear yellow urine, place on gravity  Please start Flomax give at bedtime  Bowel Regimen  OOB  when patient having BM's OOB ambulating a after 2 doses of flomax may have another TOV or can follow up at the [facility] for TOV.

## 2021-08-01 NOTE — CONSULT NOTE ADULT - SUBJECTIVE AND OBJECTIVE BOX
This is a 89 year old male with history of Essential Tremor (on Propranolol), spinal stenosis, DVT s/p IVC filter (on Warfarin, last dose 7/26/21), cholecystectomy, TURP, s/p PPM who initially presented to urgent care with lower back pain, GI upset with poor PO intake and reported 12lb weight loss.  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 He was admitted for BP control and further evaluation, was referred to Dr. Fernandez for surgical evaluation and was transferred to [facility] on 7/28/21 to complete pre-surgical testing. Pt. is no s/p Tbar and has failed 2 voiding trials. Pt reports havin gprior to admission nocturia x 1 and urge incontinence.    Pt. awake and alert, nad  Lungs: unlabored breathing  Abd: soft, nontender  Scrotum with swelling R.L testes x 2 descended nontender left hemiscrotum with some ecchymosis.  Prostate slightly enlarged no nodules or fluctuance. This is a 89 year old male with history of Essential Tremor (on Propranolol), spinal stenosis, DVT s/p IVC filter (on Warfarin, last dose 7/26/21), cholecystectomy, TURP, s/p PPM who initially presented to urgent care with lower back pain, GI upset with poor PO intake and reported 12lb weight loss.  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 He was admitted for BP control and further evaluation, was referred to Dr. Fernandez for surgical evaluation and was transferred to [facility] on 7/28/21 to complete pre-surgical testing. Pt. is no s/p Tbar and has failed 2 voiding trials. Pt reports havin gprior to admission nocturia x 1 and urge incontinence.    Pt. awake and alert, nad  Lungs: unlabored breathing  Abd: soft, nontender  Scrotum with swelling R.L testes x 2 descended nontender left hemiscrotum with some ecchymosis.  Phallus: retracted, no active bleeding or drainage  Prostate slightly enlarged no nodules or fluctuance.

## 2021-08-02 LAB
ANION GAP SERPL CALC-SCNC: 6 MMOL/L — SIGNIFICANT CHANGE UP (ref 5–17)
APTT BLD: 28.8 SEC — SIGNIFICANT CHANGE UP (ref 27.5–35.5)
BUN SERPL-MCNC: 38 MG/DL — HIGH (ref 7–23)
CALCIUM SERPL-MCNC: 9.1 MG/DL — SIGNIFICANT CHANGE UP (ref 8.4–10.5)
CHLORIDE SERPL-SCNC: 109 MMOL/L — HIGH (ref 96–108)
CO2 SERPL-SCNC: 23 MMOL/L — SIGNIFICANT CHANGE UP (ref 22–31)
CREAT SERPL-MCNC: 1.58 MG/DL — HIGH (ref 0.5–1.3)
GLUCOSE SERPL-MCNC: 146 MG/DL — HIGH (ref 70–99)
HCT VFR BLD CALC: 30.6 % — LOW (ref 39–50)
HCT VFR BLD CALC: 30.8 % — LOW (ref 39–50)
HGB BLD-MCNC: 9.7 G/DL — LOW (ref 13–17)
HGB BLD-MCNC: 9.9 G/DL — LOW (ref 13–17)
INR BLD: 1.24 — HIGH (ref 0.88–1.16)
MAGNESIUM SERPL-MCNC: 2.3 MG/DL — SIGNIFICANT CHANGE UP (ref 1.6–2.6)
MCHC RBC-ENTMCNC: 29.4 PG — SIGNIFICANT CHANGE UP (ref 27–34)
MCHC RBC-ENTMCNC: 29.4 PG — SIGNIFICANT CHANGE UP (ref 27–34)
MCHC RBC-ENTMCNC: 31.7 GM/DL — LOW (ref 32–36)
MCHC RBC-ENTMCNC: 32.1 GM/DL — SIGNIFICANT CHANGE UP (ref 32–36)
MCV RBC AUTO: 91.4 FL — SIGNIFICANT CHANGE UP (ref 80–100)
MCV RBC AUTO: 92.7 FL — SIGNIFICANT CHANGE UP (ref 80–100)
NRBC # BLD: 0 /100 WBCS — SIGNIFICANT CHANGE UP (ref 0–0)
NRBC # BLD: 0 /100 WBCS — SIGNIFICANT CHANGE UP (ref 0–0)
PLATELET # BLD AUTO: 63 K/UL — LOW (ref 150–400)
PLATELET # BLD AUTO: 65 K/UL — LOW (ref 150–400)
POTASSIUM SERPL-MCNC: 4 MMOL/L — SIGNIFICANT CHANGE UP (ref 3.5–5.3)
POTASSIUM SERPL-SCNC: 4 MMOL/L — SIGNIFICANT CHANGE UP (ref 3.5–5.3)
PROTHROM AB SERPL-ACNC: 14.7 SEC — HIGH (ref 10.6–13.6)
RBC # BLD: 3.3 M/UL — LOW (ref 4.2–5.8)
RBC # BLD: 3.37 M/UL — LOW (ref 4.2–5.8)
RBC # FLD: 14.5 % — SIGNIFICANT CHANGE UP (ref 10.3–14.5)
RBC # FLD: 14.6 % — HIGH (ref 10.3–14.5)
SODIUM SERPL-SCNC: 138 MMOL/L — SIGNIFICANT CHANGE UP (ref 135–145)
WBC # BLD: 9.32 K/UL — SIGNIFICANT CHANGE UP (ref 3.8–10.5)
WBC # BLD: 9.45 K/UL — SIGNIFICANT CHANGE UP (ref 3.8–10.5)
WBC # FLD AUTO: 9.32 K/UL — SIGNIFICANT CHANGE UP (ref 3.8–10.5)
WBC # FLD AUTO: 9.45 K/UL — SIGNIFICANT CHANGE UP (ref 3.8–10.5)

## 2021-08-02 PROCEDURE — 71045 X-RAY EXAM CHEST 1 VIEW: CPT | Mod: 26

## 2021-08-02 PROCEDURE — 93308 TTE F-UP OR LMTD: CPT | Mod: 26

## 2021-08-02 PROCEDURE — 99232 SBSQ HOSP IP/OBS MODERATE 35: CPT

## 2021-08-02 RX ORDER — FUROSEMIDE 40 MG
40 TABLET ORAL ONCE
Refills: 0 | Status: COMPLETED | OUTPATIENT
Start: 2021-08-02 | End: 2021-08-02

## 2021-08-02 RX ORDER — ALBUMIN HUMAN 25 %
250 VIAL (ML) INTRAVENOUS
Refills: 0 | Status: COMPLETED | OUTPATIENT
Start: 2021-08-02 | End: 2021-08-02

## 2021-08-02 RX ORDER — FUROSEMIDE 40 MG
20 TABLET ORAL ONCE
Refills: 0 | Status: COMPLETED | OUTPATIENT
Start: 2021-08-02 | End: 2021-08-02

## 2021-08-02 RX ADMIN — Medication 2 MILLIGRAM(S): at 12:02

## 2021-08-02 RX ADMIN — GABAPENTIN 400 MILLIGRAM(S): 400 CAPSULE ORAL at 17:51

## 2021-08-02 RX ADMIN — CHLORHEXIDINE GLUCONATE 5 MILLILITER(S): 213 SOLUTION TOPICAL at 17:39

## 2021-08-02 RX ADMIN — MIDODRINE HYDROCHLORIDE 10 MILLIGRAM(S): 2.5 TABLET ORAL at 05:35

## 2021-08-02 RX ADMIN — Medication 50 MICROGRAM(S): at 05:35

## 2021-08-02 RX ADMIN — Medication 2 MILLIGRAM(S): at 19:11

## 2021-08-02 RX ADMIN — SODIUM CHLORIDE 3 MILLILITER(S): 9 INJECTION INTRAMUSCULAR; INTRAVENOUS; SUBCUTANEOUS at 14:40

## 2021-08-02 RX ADMIN — SENNA PLUS 2 TABLET(S): 8.6 TABLET ORAL at 21:52

## 2021-08-02 RX ADMIN — SODIUM CHLORIDE 3 MILLILITER(S): 9 INJECTION INTRAMUSCULAR; INTRAVENOUS; SUBCUTANEOUS at 21:43

## 2021-08-02 RX ADMIN — Medication 20 MILLIGRAM(S): at 14:00

## 2021-08-02 RX ADMIN — CHLORHEXIDINE GLUCONATE 5 MILLILITER(S): 213 SOLUTION TOPICAL at 05:35

## 2021-08-02 RX ADMIN — GABAPENTIN 400 MILLIGRAM(S): 400 CAPSULE ORAL at 05:35

## 2021-08-02 RX ADMIN — Medication 2 MILLIGRAM(S): at 12:00

## 2021-08-02 RX ADMIN — Medication 125 MILLILITER(S): at 14:00

## 2021-08-02 RX ADMIN — Medication 5 MILLIGRAM(S): at 17:51

## 2021-08-02 RX ADMIN — Medication 50 MILLIGRAM(S): at 11:16

## 2021-08-02 RX ADMIN — PANTOPRAZOLE SODIUM 40 MILLIGRAM(S): 20 TABLET, DELAYED RELEASE ORAL at 05:38

## 2021-08-02 RX ADMIN — ATORVASTATIN CALCIUM 10 MILLIGRAM(S): 80 TABLET, FILM COATED ORAL at 21:52

## 2021-08-02 RX ADMIN — GABAPENTIN 600 MILLIGRAM(S): 400 CAPSULE ORAL at 21:52

## 2021-08-02 RX ADMIN — SODIUM CHLORIDE 3 MILLILITER(S): 9 INJECTION INTRAMUSCULAR; INTRAVENOUS; SUBCUTANEOUS at 04:20

## 2021-08-02 RX ADMIN — Medication 2 MILLIGRAM(S): at 04:22

## 2021-08-02 NOTE — PROGRESS NOTE ADULT - ASSESSMENT
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 He is doing well. No groin hematoma, no swelling, no induration bilaterally. DP pulses +2 bilaterally.   Plan:   1- Continue frequent neurovascular checks of the groin and lower extremities.   2. Recommend MAP > 90   3. Care per ICU, vascular will continue to follow.

## 2021-08-02 NOTE — PROGRESS NOTE ADULT - SUBJECTIVE AND OBJECTIVE BOX
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    H/O  TURP (Transurethral Resection of Prostate)    S/P tonsillectomy  in 1932      Patient is a 89y old  Male who presents with a chief complaint of Type B Aortic Dissection (02 Aug 2021 10:59)      14 system review limited by mentation and multiorgan morbidity     Vital signs, hemodynamic and respiratory parameters were reviewed from the bedside nursing flowsheet.  ICU Vital Signs Last 24 Hrs  T(C): 37.3 (02 Aug 2021 09:00), Max: 37.3 (02 Aug 2021 09:00)  T(F): 99.2 (02 Aug 2021 09:00), Max: 99.2 (02 Aug 2021 09:00)  HR: 71 (02 Aug 2021 12:00) (66 - 90)  BP: 138/63 (02 Aug 2021 12:00) (119/60 - 170/81)  BP(mean): 90 (02 Aug 2021 12:00) (83 - 112)  ABP: --  ABP(mean): --  RR: 18 (02 Aug 2021 12:00) (15 - 18)  SpO2: 95% (02 Aug 2021 12:00) (93% - 98%)    Adult Advanced Hemodynamics Last 24 Hrs  CVP(mm Hg): --  CVP(cm H2O): --  CO: --  CI: --  PA: --  PA(mean): --  PCWP: --  SVR: --  SVRI: --  PVR: --  PVRI: --,     Intake and output was reviewed and the fluid balance was calculated  Daily     Daily   I&O's Summary    01 Aug 2021 07:01  -  02 Aug 2021 07:00  --------------------------------------------------------  IN: 300 mL / OUT: 1680 mL / NET: -1380 mL    02 Aug 2021 07:01  -  02 Aug 2021 13:01  --------------------------------------------------------  IN: 0 mL / OUT: 205 mL / NET: -205 mL        All lines and drain sites were assessed  Glycemic trend was reviewedCAPILLARY BLOOD GLUCOSE        No acute change in focality  Auscultation of the chest reveals equal bs  Abdomen is soft  Extremities are warm and well perfused  Wounds appear clean and unremarkable  Antibiotics are periop    labs  CBC Full  -  ( 02 Aug 2021 05:32 )  WBC Count : 9.45 K/uL  RBC Count : 3.37 M/uL  Hemoglobin : 9.9 g/dL  Hematocrit : 30.8 %  Platelet Count - Automated : 63 K/uL  Mean Cell Volume : 91.4 fl  Mean Cell Hemoglobin : 29.4 pg  Mean Cell Hemoglobin Concentration : 32.1 gm/dL  Auto Neutrophil # : x  Auto Lymphocyte # : x  Auto Monocyte # : x  Auto Eosinophil # : x  Auto Basophil # : x  Auto Neutrophil % : x  Auto Lymphocyte % : x  Auto Monocyte % : x  Auto Eosinophil % : x  Auto Basophil % : x    08-02    138  |  109<H>  |  38<H>  ----------------------------<  146<H>  4.0   |  23  |  1.58<H>    Ca    9.1      02 Aug 2021 03:43  Mg     2.3     08-02      PT/INR - ( 02 Aug 2021 03:43 )   PT: 14.7 sec;   INR: 1.24          PTT - ( 02 Aug 2021 03:43 )  PTT:28.8 sec  The current medications were reviewed   MEDICATIONS  (STANDING):  atorvastatin 10 milliGRAM(s) Oral at bedtime  chlorhexidine 0.12% Liquid 5 milliLiter(s) Oral Mucosa two times a day  gabapentin 400 milliGRAM(s) Oral two times a day  gabapentin 600 milliGRAM(s) Oral at bedtime  levothyroxine 50 MICROGram(s) Oral daily  midodrine. 10 milliGRAM(s) Oral three times a day  pantoprazole    Tablet 40 milliGRAM(s) Oral before breakfast  propranolol 20 milliGRAM(s) Oral at bedtime  senna 2 Tablet(s) Oral at bedtime  sodium chloride 0.9% lock flush 3 milliLiter(s) IV Push every 8 hours  sodium chloride 0.9%. 1000 milliLiter(s) (10 mL/Hr) IV Continuous <Continuous>  testosterone patch 2 mG/24 Hr(s) 2 milliGRAM(s) Transdermal daily  topiramate 50 milliGRAM(s) Oral daily    MEDICATIONS  (PRN):  acetaminophen   Tablet .. 650 milliGRAM(s) Oral every 6 hours PRN Temp greater or equal to 38C (100.4F), Mild Pain (1 - 3)  benzocaine 15 mG/menthol 3.6 mG Lozenge 1 Lozenge Oral every 3 hours PRN Sore Throat       PROBLEM LIST/ ASSESSMENT:  HEALTH ISSUES - PROBLEM Dx:      ,   Patient is a 89y old  Male who presents with a chief complaint of Type B Aortic Dissection (02 Aug 2021 10:59)     s/p tevar    frailty                My plan includes :  close hemodynamic, ventilatory and drain monitoring and management per post op routine   will assess ambulation with pt    accordingly dispo plan to home vs rehab    Monitor for arrhythmias and monitor parameters for organ perfusion  beta blockade not administered due to hemodynamic instability and bradycardia  monitor neurologic status  Head of the bed should remain elevated to 45 deg .   chest PT and IS will be encouraged  monitor adequacy of oxygenation and ventilation and attempt to wean oxygen  antibiotic regimen will be tailored to the clinical, laboratory and microbiologic data  Nutritional goals will be met using po eventually , ensure adequate caloric intake and montior the same  Stress ulcer and VTE prophylaxis will be achieved    Glycemic control is satisfactory  Electrolytes have been repleted as necessary and wound care has been carried out. Pain control has been achieved.   agressive physical therapy and early mobility and ambulation goals will be met   The family was updated about the course and plan  CRITICAL CARE TIME personally provided by me  in evaluation and management, reassessments, review and interpretation of labs and x-rays, ventilator and hemodynamic management, formulating a plan and coordinating care: ___90____ MIN.  Time does not include procedural time. Time spent was non routine post-operarive caRE and included multiple and repeated evaluations at the bedside  CTICU ATTENDING     					    Angel Luis Bay MD

## 2021-08-02 NOTE — DIETITIAN INITIAL EVALUATION ADULT. - ADD RECOMMEND
1. Increase supplement to BID 2. Honor food preferences, liberalize diet 3. Manage pain 4. Reinforce ed 5. Monitor and replete lytes

## 2021-08-02 NOTE — DIETITIAN INITIAL EVALUATION ADULT. - ORAL INTAKE PTA/DIET HISTORY
typically consumes what wife prepares for him, otherwise has easy things like cottage cheese and fruit

## 2021-08-02 NOTE — DIETITIAN NUTRITION RISK NOTIFICATION - TREATMENT: THE FOLLOWING DIET HAS BEEN RECOMMENDED
Pt meets ASPEN criteria for moderate malnutrition in setting of acute illness AEB 5kg, 4% wt loss PTA, <75% EER being met > 1 week    1. Increase supplement to Ensure Enlive BID (700 kcal, 40g protein, 360 mL free H2O)   2. Liberalize diet and honor food preferences   3. Manage pain   4. Reinforce ed   5. Monitor and replete lytes

## 2021-08-02 NOTE — DIETITIAN INITIAL EVALUATION ADULT. - MALNUTRITION
pt meets ASPEN criteria for moderate malnutrition in setting of acute illness AEB 5kg, 4% wt loss PTA, <75% EER being met > 1 week

## 2021-08-02 NOTE — DIETITIAN INITIAL EVALUATION ADULT. - OTHER CALCULATIONS
ABW used for calculations as pt between % of IBW. (119% IBW). Nutrient needs based on [facility] standards of care for maintenance in adults, adjusted for age, post-op needs, fluid per team

## 2021-08-02 NOTE — PROGRESS NOTE ADULT - ASSESSMENT
89 year old male with history of Essential Tremor (on Propranolol), spinal stenosis, DVT s/p IVC filter (on Warfarin, last dose 7/26/21), cholecystectomy, TURP, s/p PPM who initially presented to urgent care with lower back pain, GI upset with poor PO intake and reported 12lb weight loss.  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 He was admitted for BP control and further evaluation, was referred to Dr. Fernandez for surgical evaluation and was transferred to [facility] on 7/28/21 to complete pre-surgical testing. ICU Course: lacy placed by urology preop, cardene post op, volume given POD1 for hypotension, started on midodrine POD2, failed TOV with replacement of lacy by  on POD3 . POD5 patient transferred to [facility] for continued care.       Neurovascular  Hx of Essential Tremor  -continue Propanalol   -No delirium, pain well managed on current regimen  -C/w PRNs for Pain control  -Monitor neuro status     Respiratory  -Saturates well on RA     -AM CXR stable, repeat in AM  -Encourage IS 10x/hour while awake, Cough and deep breathing exercises  -Monitor respiratory status via SpO2    Cardiovascular  Type B Aortic Dissection  -s/p TEVAR on 7/28    2. Hx of IVC filter  -Will hold home AC in the setting of Type B Dissection    GI  -Tolerating PO  -Prophylaxis: Protonix  -C/w bowel regimen    /Renal  -BUN/Cr: Baseline Cr 1.5 on admission  -Trend Cr on AM labs  -Replete electrolytes as needed    ID  Afebrile, asymptomatic  -WBC wnl  -Continue to monitor for SIRS/Sepsis syndrome while inpatient    Endocrine  -A1C: 5.6  -TSH: Thyroid 4.450     - Continue Synthroid 50qday    Hematologic  -H/H: stable  -CBC, chem in AM  -DVT ppx: SCDs, will discuss DVT ppx, hx of IVC filter       -AC held in setting of Tpe B      -Subq held plt count 63    Disposition Planning  Home with family support (live above him) when medically approrpiate     89 year old male with history of Essential Tremor (on Propranolol), spinal stenosis, DVT s/p IVC filter (on Warfarin, last dose 7/26/21), cholecystectomy, TURP, s/p PPM who initially presented to urgent care with lower back pain, GI upset with poor PO intake and reported 12lb weight loss.  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 He was admitted for BP control and further evaluation, was referred to Dr. Fernandez for surgical evaluation and was transferred to [facility] on 7/28/21 to complete pre-surgical testing. ICU Course: lacy placed by urology preop, cardene post op, volume given POD1 for hypotension, started on midodrine POD2, failed TOV with replacement of lacy by  on POD3 . POD5 patient transferred to [facility] for continued care.       Neurovascular  Hx of Essential Tremor  -continue Propanalol   -No delirium, pain well managed on current regimen  -C/w PRNs for Pain control  -Monitor neuro status     Respiratory  -Saturates well on RA     -AM CXR stable, repeat in AM  -Encourage IS 10x/hour while awake, Cough and deep breathing exercises  -Monitor respiratory status via SpO2    Cardiovascular  Type B Aortic Dissection  -s/p TEVAR on 7/28    2. Hx of IVC filter  -AC to be restarted outpatient in 1 month with Dr. Mcdermott    GI  -Tolerating PO  -Prophylaxis: Protonix  -C/w bowel regimen    /Renal  -BUN/Cr: Baseline Cr 1.5 on admission  -Trend Cr on AM labs  -Replete electrolytes as needed    ID  Afebrile, asymptomatic  -WBC wnl  -Continue to monitor for SIRS/Sepsis syndrome while inpatient    Endocrine  -A1C: 5.6  -TSH: Thyroid 4.450     - Continue Synthroid 50qday    Hematologic  -H/H: stable  -CBC, chem in AM  -DVT ppx: SCDs, will discuss DVT ppx, hx of IVC filter       -AC to be restarted outpatient in 1 month with Dr. Mcdermott      -Subq held plt count 63    Disposition Planning  Home with family support (live above him) when medically approrpiate     89 year old male with history of Essential Tremor (on Propranolol), spinal stenosis, DVT s/p IVC filter (on Warfarin, last dose 7/26/21), cholecystectomy, TURP, s/p PPM who initially presented to urgent care with lower back pain, GI upset with poor PO intake and reported 12lb weight loss.  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 He was admitted for BP control and further evaluation, was referred to Dr. Fernandez for surgical evaluation and was transferred to [facility] on 7/28/21 to complete pre-surgical testing. ICU Course: lacy placed by urology preop, cardene post op, volume given POD1 for hypotension, started on midodrine POD2, failed TOV with replacement of lacy by  on POD3 . POD5 patient transferred to [facility] for continued care.       Neurovascular  Hx of Essential Tremor  -continue Propanalol   -No delirium, pain well managed on current regimen  -C/w PRNs for Pain control  -Monitor neuro status     Respiratory  -Saturates well on RA     -AM CXR stable, repeat in AM  -Encourage IS 10x/hour while awake, Cough and deep breathing exercises  -Monitor respiratory status via SpO2    Cardiovascular  Type B Aortic Dissection  -s/p TEVAR on 7/28    2. Hx of IVC filter  -AC to be restarted outpatient in 1 month with Dr. Mcdermott    GI  -Tolerating PO  -Prophylaxis: Protonix  -C/w bowel regimen    /Renal  -BUN/Cr: Baseline Cr 1.5 on admission  -Trend Cr on AM labs  -Replete electrolytes as needed  -Lacy placed by Renal, currently following     - patient will be discharged with lacy with outpatient nephro follow up    ID  Afebrile, asymptomatic  -WBC wnl  -Continue to monitor for SIRS/Sepsis syndrome while inpatient    Endocrine  -A1C: 5.6  -TSH: Thyroid 4.450     - Continue Synthroid 50qday    Hematologic  -H/H: stable  -CBC, chem in AM  -DVT ppx: SCDs, will discuss DVT ppx, hx of IVC filter       -AC to be restarted outpatient in 1 month with Dr. Mcdermott      -Subq held plt count 63    Disposition Planning  Home with family support (live above him) when medically approrpiate     89 year old male with history of Essential Tremor (on Propranolol), spinal stenosis, DVT s/p IVC filter (on Warfarin, last dose 7/26/21), cholecystectomy, TURP, s/p PPM who initially presented to urgent care with lower back pain, GI upset with poor PO intake and reported 12lb weight loss.  He reported to [facility] 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 He was admitted for BP control and further evaluation, was referred to Dr. Fernandez for surgical evaluation and was transferred to [facility] on 7/28/21 to complete pre-surgical testing. ICU Course: lacy placed by urology preop, cardene post op, volume given POD1 for hypotension, started on midodrine POD2, failed TOV with replacement of lacy by  on POD3 . POD5 patient transferred to [facility] for continued care.       Neurovascular  Hx of Essential Tremor  -continue Propanalol   -No delirium, pain well managed on current regimen  -C/w PRNs for Pain control  -Monitor neuro status     Respiratory  -Saturates well on RA     -AM CXR stable, repeat in AM  -Encourage IS 10x/hour while awake, Cough and deep breathing exercises  -Monitor respiratory status via SpO2    Cardiovascular  Type B Aortic Dissection  -s/p TEVAR on 7/28  -Midodrine in place for hypotension, eval in AM     2. Hx of IVC filter  -AC to be restarted outpatient in 1 month with Dr. Mcdermott    GI  -Tolerating PO  -Prophylaxis: Protonix  -C/w bowel regimen    /Renal  -BUN/Cr: Baseline Cr 1.5 on admission  -Trend Cr on AM labs  -Replete electrolytes as needed  -Lacy placed by Renal, currently following     - patient will be discharged with lacy with outpatient nephro follow up    ID  Afebrile, asymptomatic  -WBC wnl  -Continue to monitor for SIRS/Sepsis syndrome while inpatient    Endocrine  -A1C: 5.6  -TSH: Thyroid 4.450     - Continue Synthroid 50qday    Hematologic  -H/H: stable  -CBC, chem in AM  -DVT ppx: SCDs, will discuss DVT ppx, hx of IVC filter       -AC to be restarted outpatient in 1 month with Dr. Mcdermott      -Subq held plt count 63    Disposition Planning  Home with family support (live above him) when medically approrpiate

## 2021-08-02 NOTE — PROGRESS NOTE ADULT - SUBJECTIVE AND OBJECTIVE BOX
Patient seen and examined bedside with chief resident    midodrine. 10 milliGRAM(s) Oral three times a day  propranolol 20 milliGRAM(s) Oral at bedtime      Vital Signs Last 24 Hrs  T(C): 37.3 (02 Aug 2021 09:00), Max: 37.3 (02 Aug 2021 09:00)  T(F): 99.2 (02 Aug 2021 09:00), Max: 99.2 (02 Aug 2021 09:00)  HR: 76 (02 Aug 2021 09:00) (66 - 90)  BP: 141/65 (02 Aug 2021 09:00) (98/56 - 181/84)  BP(mean): 94 (02 Aug 2021 09:00) (72 - 112)  RR: 16 (02 Aug 2021 09:00) (15 - 18)  SpO2: 95% (02 Aug 2021 09:00) (93% - 98%)    I&O's Detail    01 Aug 2021 07:01  -  02 Aug 2021 07:00  --------------------------------------------------------  IN:    Oral Fluid: 240 mL    sodium chloride 0.9%: 60 mL  Total IN: 300 mL    OUT:    Indwelling Catheter - Urethral (mL): 1680 mL  Total OUT: 1680 mL    Total NET: -1380 mL      02 Aug 2021 07:01  -  02 Aug 2021 11:00  --------------------------------------------------------  IN:  Total IN: 0 mL    OUT:    Indwelling Catheter - Urethral (mL): 60 mL  Total OUT: 60 mL    Total NET: -60 mL        PHYSICAL EXAM:    Constitutional: sitting in his chair comfortable, NAD  Gastrointestinal: soft abdomen  Genitourinary: voiding  Extremities: palpable pulses bilaterally. No inguinal hematoma bilaterally                               Patient seen and examined bedside with chief resident. He is endorsed some abdominal distension and has not passed bowel movement for two days. Otherwise denies any groin pain bilaterally.   midodrine. 10 milliGRAM(s) Oral three times a day  propranolol 20 milliGRAM(s) Oral at bedtime      Vital Signs Last 24 Hrs  T(C): 37.3 (02 Aug 2021 09:00), Max: 37.3 (02 Aug 2021 09:00)  T(F): 99.2 (02 Aug 2021 09:00), Max: 99.2 (02 Aug 2021 09:00)  HR: 76 (02 Aug 2021 09:00) (66 - 90)  BP: 141/65 (02 Aug 2021 09:00) (98/56 - 181/84)  BP(mean): 94 (02 Aug 2021 09:00) (72 - 112)  RR: 16 (02 Aug 2021 09:00) (15 - 18)  SpO2: 95% (02 Aug 2021 09:00) (93% - 98%)    I&O's Detail    01 Aug 2021 07:01  -  02 Aug 2021 07:00  --------------------------------------------------------  IN:    Oral Fluid: 240 mL    sodium chloride 0.9%: 60 mL  Total IN: 300 mL    OUT:    Indwelling Catheter - Urethral (mL): 1680 mL  Total OUT: 1680 mL    Total NET: -1380 mL      02 Aug 2021 07:01  -  02 Aug 2021 11:00  --------------------------------------------------------  IN:  Total IN: 0 mL    OUT:    Indwelling Catheter - Urethral (mL): 60 mL  Total OUT: 60 mL    Total NET: -60 mL        PHYSICAL EXAM:    Constitutional: sitting in his chair comfortable, NAD  Gastrointestinal: mildly distended but soft and minimally tender  Genitourinary: voiding  Extremities: palpable pulses bilaterally. No inguinal hematoma, swelling, induration, or drainage bilaterally

## 2021-08-02 NOTE — PROGRESS NOTE ADULT - SUBJECTIVE AND OBJECTIVE BOX
Patient discussed on morning rounds with Dr. Fernandez    Operation / Date: 7/30/21 TEVAR     SUBJECTIVE ASSESSMENT:  89y Male transferred from [address] [place] [address]. Patient states that he is doing well is still run down.         Vital Signs Last 24 Hrs  T(C): 37.3 (02 Aug 2021 09:00), Max: 37.3 (02 Aug 2021 09:00)  T(F): 99.2 (02 Aug 2021 09:00), Max: 99.2 (02 Aug 2021 09:00)  HR: 76 (02 Aug 2021 14:41) (66 - 90)  BP: 156/116 (02 Aug 2021 14:41) (119/60 - 170/81)  BP(mean): 132 (02 Aug 2021 14:41) (83 - 132)  RR: 19 (02 Aug 2021 14:41) (15 - 22)  SpO2: 99% (02 Aug 2021 14:41) (93% - 99%)  I&O's Detail    01 Aug 2021 07:01  -  02 Aug 2021 07:00  --------------------------------------------------------  IN:    Oral Fluid: 240 mL    sodium chloride 0.9%: 60 mL  Total IN: 300 mL    OUT:    Indwelling Catheter - Urethral (mL): 1680 mL  Total OUT: 1680 mL    Total NET: -1380 mL      02 Aug 2021 07:01  -  02 Aug 2021 16:14  --------------------------------------------------------  IN:    Albumin 5%  - 250 mL: 250 mL    Oral Fluid: 400 mL  Total IN: 650 mL    OUT:    Indwelling Catheter - Urethral (mL): 670 mL  Total OUT: 670 mL    Total NET: -20 mL        CHEST TUBE:  No.   BETO DRAIN:  No.  EPICARDIAL WIRES: No.  TIE DOWNS: No.  HOLCOMB: Yes    PHYSICAL EXAM:  Neuro: A+O x 3, non-focal, speech clear and intact  HEENT: PERRL, EOMI, oral mucosa pink and moist  Neck: supple, no JVD  CV: regular rate, regular rhythm, +S1S2, no murmurs or rub  Pulm/chest: lung sounds CTA and equal bilaterally, no accessory muscle use noted  Abd: soft, NT, ND, +BS  Ext: LARSEN x 4, no C/C/E, +1 lower extremity edema through the knee  Skin: warm, well perfused, no rashes     LABS:                        9.9    9.45  )-----------( 63       ( 02 Aug 2021 05:32 )             30.8       COUMADIN:  Yes/No. REASON: .    PT/INR - ( 02 Aug 2021 03:43 )   PT: 14.7 sec;   INR: 1.24          PTT - ( 02 Aug 2021 03:43 )  PTT:28.8 sec    08-02    138  |  109<H>  |  38<H>  ----------------------------<  146<H>  4.0   |  23  |  1.58<H>    Ca    9.1      02 Aug 2021 03:43  Mg     2.3     08-02            MEDICATIONS  (STANDING):  atorvastatin 10 milliGRAM(s) Oral at bedtime  chlorhexidine 0.12% Liquid 5 milliLiter(s) Oral Mucosa two times a day  gabapentin 400 milliGRAM(s) Oral two times a day  gabapentin 600 milliGRAM(s) Oral at bedtime  levothyroxine 50 MICROGram(s) Oral daily  midodrine. 10 milliGRAM(s) Oral three times a day  pantoprazole    Tablet 40 milliGRAM(s) Oral before breakfast  propranolol 20 milliGRAM(s) Oral at bedtime  senna 2 Tablet(s) Oral at bedtime  sodium chloride 0.9% lock flush 3 milliLiter(s) IV Push every 8 hours  sodium chloride 0.9%. 1000 milliLiter(s) (10 mL/Hr) IV Continuous <Continuous>  testosterone patch 2 mG/24 Hr(s) 2 milliGRAM(s) Transdermal daily  topiramate 50 milliGRAM(s) Oral daily    MEDICATIONS  (PRN):  acetaminophen   Tablet .. 650 milliGRAM(s) Oral every 6 hours PRN Temp greater or equal to 38C (100.4F), Mild Pain (1 - 3)  benzocaine 15 mG/menthol 3.6 mG Lozenge 1 Lozenge Oral every 3 hours PRN Sore Throat

## 2021-08-02 NOTE — DIETITIAN INITIAL EVALUATION ADULT. - OTHER INFO
89 year old male with history of Essential Tremor (on Propranolol), spinal stenosis, DVT s/p IVC filter (on Warfarin, last dose 7/26/21), cholecystectomy, TURP, s/p PPM who initially presented to urgent care with lower back pain, GI upset with poor PO intake and reported 12lb weight loss.  He reported to [facility] ED per their recommendation and underwent CTA Chest/Abdomen/Pelvis which revealed a 6.4cm descending aortic aneurysm with adjacent high density fluid/hematoma concerning for impending rupture. He was admitted for BP control and further evaluation, was referred to Dr. Fernandez for surgical evaluation and was transferred to [facility] on 7/28/21 to complete pre-surgical testing. Underwent TEVAR 7/30 for type B dissection. Remains on 9E at this time for post-op management and monitoring. Notes at this time not much appetite, which started PTA. Had large bout of diarrhea and fluid loss as a result which contributed to poor intake and wt loss. Has not regained his appetite since. Enjoying ensure supplements at time of assessment. Denies n/v/d, no BM noted x2 days, no chewing/ swallowing issues, NKFA, skin with 2+ edema to R ankle/ L ankle, groin incision. Encouraged intake through day to meet needs, discussed DASH diet and consumption of Ensure to meet needs. Will continue to follow per protocol.

## 2021-08-03 ENCOUNTER — TRANSCRIPTION ENCOUNTER (OUTPATIENT)
Age: 86
End: 2021-08-03

## 2021-08-03 VITALS
SYSTOLIC BLOOD PRESSURE: 158 MMHG | HEART RATE: 92 BPM | OXYGEN SATURATION: 95 % | DIASTOLIC BLOOD PRESSURE: 68 MMHG | RESPIRATION RATE: 20 BRPM

## 2021-08-03 PROCEDURE — 84132 ASSAY OF SERUM POTASSIUM: CPT

## 2021-08-03 PROCEDURE — 93306 TTE W/DOPPLER COMPLETE: CPT

## 2021-08-03 PROCEDURE — 83036 HEMOGLOBIN GLYCOSYLATED A1C: CPT

## 2021-08-03 PROCEDURE — C1874: CPT

## 2021-08-03 PROCEDURE — 86900 BLOOD TYPING SEROLOGIC ABO: CPT

## 2021-08-03 PROCEDURE — 82330 ASSAY OF CALCIUM: CPT

## 2021-08-03 PROCEDURE — C1887: CPT

## 2021-08-03 PROCEDURE — C1889: CPT

## 2021-08-03 PROCEDURE — 83880 ASSAY OF NATRIURETIC PEPTIDE: CPT

## 2021-08-03 PROCEDURE — C1894: CPT

## 2021-08-03 PROCEDURE — 85730 THROMBOPLASTIN TIME PARTIAL: CPT

## 2021-08-03 PROCEDURE — 97116 GAIT TRAINING THERAPY: CPT

## 2021-08-03 PROCEDURE — 94150 VITAL CAPACITY TEST: CPT

## 2021-08-03 PROCEDURE — 86901 BLOOD TYPING SEROLOGIC RH(D): CPT

## 2021-08-03 PROCEDURE — 93321 DOPPLER ECHO F-UP/LMTD STD: CPT

## 2021-08-03 PROCEDURE — 80053 COMPREHEN METABOLIC PANEL: CPT

## 2021-08-03 PROCEDURE — P9045: CPT

## 2021-08-03 PROCEDURE — C1760: CPT

## 2021-08-03 PROCEDURE — 93880 EXTRACRANIAL BILAT STUDY: CPT

## 2021-08-03 PROCEDURE — 84100 ASSAY OF PHOSPHORUS: CPT

## 2021-08-03 PROCEDURE — C1769: CPT

## 2021-08-03 PROCEDURE — 76000 FLUOROSCOPY <1 HR PHYS/QHP: CPT

## 2021-08-03 PROCEDURE — 36415 COLL VENOUS BLD VENIPUNCTURE: CPT

## 2021-08-03 PROCEDURE — 71045 X-RAY EXAM CHEST 1 VIEW: CPT

## 2021-08-03 PROCEDURE — 84443 ASSAY THYROID STIM HORMONE: CPT

## 2021-08-03 PROCEDURE — 80048 BASIC METABOLIC PNL TOTAL CA: CPT

## 2021-08-03 PROCEDURE — 86850 RBC ANTIBODY SCREEN: CPT

## 2021-08-03 PROCEDURE — 97161 PT EVAL LOW COMPLEX 20 MIN: CPT

## 2021-08-03 PROCEDURE — 97530 THERAPEUTIC ACTIVITIES: CPT

## 2021-08-03 PROCEDURE — 83735 ASSAY OF MAGNESIUM: CPT

## 2021-08-03 PROCEDURE — 93005 ELECTROCARDIOGRAM TRACING: CPT

## 2021-08-03 PROCEDURE — 83605 ASSAY OF LACTIC ACID: CPT

## 2021-08-03 PROCEDURE — 84295 ASSAY OF SERUM SODIUM: CPT

## 2021-08-03 PROCEDURE — 71045 X-RAY EXAM CHEST 1 VIEW: CPT | Mod: 26

## 2021-08-03 PROCEDURE — 86923 COMPATIBILITY TEST ELECTRIC: CPT

## 2021-08-03 PROCEDURE — 82803 BLOOD GASES ANY COMBINATION: CPT

## 2021-08-03 PROCEDURE — 85027 COMPLETE CBC AUTOMATED: CPT

## 2021-08-03 PROCEDURE — 86891 AUTOLOGOUS BLOOD OP SALVAGE: CPT

## 2021-08-03 PROCEDURE — 85610 PROTHROMBIN TIME: CPT

## 2021-08-03 PROCEDURE — 85025 COMPLETE CBC W/AUTO DIFF WBC: CPT

## 2021-08-03 RX ORDER — TAMSULOSIN HYDROCHLORIDE 0.4 MG/1
0.4 CAPSULE ORAL AT BEDTIME
Refills: 0 | Status: DISCONTINUED | OUTPATIENT
Start: 2021-08-03 | End: 2021-08-04

## 2021-08-03 RX ORDER — MINERAL OIL
133 OIL (ML) MISCELLANEOUS ONCE
Refills: 0 | Status: COMPLETED | OUTPATIENT
Start: 2021-08-03 | End: 2021-08-03

## 2021-08-03 RX ORDER — TAMSULOSIN HYDROCHLORIDE 0.4 MG/1
1 CAPSULE ORAL
Qty: 30 | Refills: 0
Start: 2021-08-03 | End: 2021-09-01

## 2021-08-03 RX ORDER — LEVOTHYROXINE SODIUM 125 MCG
1 TABLET ORAL
Qty: 30 | Refills: 0
Start: 2021-08-03 | End: 2021-09-01

## 2021-08-03 RX ORDER — FAMOTIDINE 10 MG/ML
1 INJECTION INTRAVENOUS
Qty: 30 | Refills: 0
Start: 2021-08-03 | End: 2021-09-01

## 2021-08-03 RX ORDER — MIDODRINE HYDROCHLORIDE 2.5 MG/1
0.5 TABLET ORAL
Qty: 45 | Refills: 0
Start: 2021-08-03 | End: 2021-09-01

## 2021-08-03 RX ORDER — TOPIRAMATE 25 MG
1 TABLET ORAL
Qty: 30 | Refills: 0
Start: 2021-08-03 | End: 2021-09-01

## 2021-08-03 RX ORDER — SENNA PLUS 8.6 MG/1
2 TABLET ORAL
Qty: 60 | Refills: 0
Start: 2021-08-03 | End: 2021-09-01

## 2021-08-03 RX ORDER — MIDODRINE HYDROCHLORIDE 2.5 MG/1
1 TABLET ORAL
Qty: 42 | Refills: 0
Start: 2021-08-03 | End: 2021-08-16

## 2021-08-03 RX ORDER — TAMSULOSIN HYDROCHLORIDE 0.4 MG/1
0.4 CAPSULE ORAL ONCE
Refills: 0 | Status: COMPLETED | OUTPATIENT
Start: 2021-08-03 | End: 2021-08-03

## 2021-08-03 RX ORDER — ACETAMINOPHEN 500 MG
2 TABLET ORAL
Qty: 40 | Refills: 0
Start: 2021-08-03 | End: 2021-08-07

## 2021-08-03 RX ORDER — GABAPENTIN 400 MG/1
1 CAPSULE ORAL
Qty: 60 | Refills: 0
Start: 2021-08-03 | End: 2021-09-01

## 2021-08-03 RX ORDER — PROPRANOLOL HCL 160 MG
1 CAPSULE, EXTENDED RELEASE 24HR ORAL
Qty: 30 | Refills: 0
Start: 2021-08-03 | End: 2021-09-01

## 2021-08-03 RX ORDER — GABAPENTIN 400 MG/1
1 CAPSULE ORAL
Qty: 30 | Refills: 0
Start: 2021-08-03 | End: 2021-09-01

## 2021-08-03 RX ORDER — ATORVASTATIN CALCIUM 80 MG/1
1 TABLET, FILM COATED ORAL
Qty: 30 | Refills: 0
Start: 2021-08-03 | End: 2021-09-01

## 2021-08-03 RX ADMIN — GABAPENTIN 400 MILLIGRAM(S): 400 CAPSULE ORAL at 18:37

## 2021-08-03 RX ADMIN — Medication 5 MILLIGRAM(S): at 06:17

## 2021-08-03 RX ADMIN — MIDODRINE HYDROCHLORIDE 10 MILLIGRAM(S): 2.5 TABLET ORAL at 17:02

## 2021-08-03 RX ADMIN — SODIUM CHLORIDE 3 MILLILITER(S): 9 INJECTION INTRAMUSCULAR; INTRAVENOUS; SUBCUTANEOUS at 06:18

## 2021-08-03 RX ADMIN — Medication 10 MILLIGRAM(S): at 16:53

## 2021-08-03 RX ADMIN — Medication 2 MILLIGRAM(S): at 11:10

## 2021-08-03 RX ADMIN — Medication 2 MILLIGRAM(S): at 07:31

## 2021-08-03 RX ADMIN — Medication 50 MICROGRAM(S): at 06:17

## 2021-08-03 RX ADMIN — PANTOPRAZOLE SODIUM 40 MILLIGRAM(S): 20 TABLET, DELAYED RELEASE ORAL at 06:17

## 2021-08-03 RX ADMIN — GABAPENTIN 600 MILLIGRAM(S): 400 CAPSULE ORAL at 21:53

## 2021-08-03 RX ADMIN — Medication 50 MILLIGRAM(S): at 11:11

## 2021-08-03 RX ADMIN — MIDODRINE HYDROCHLORIDE 10 MILLIGRAM(S): 2.5 TABLET ORAL at 11:11

## 2021-08-03 RX ADMIN — ATORVASTATIN CALCIUM 10 MILLIGRAM(S): 80 TABLET, FILM COATED ORAL at 21:53

## 2021-08-03 RX ADMIN — CHLORHEXIDINE GLUCONATE 5 MILLILITER(S): 213 SOLUTION TOPICAL at 06:18

## 2021-08-03 RX ADMIN — SODIUM CHLORIDE 3 MILLILITER(S): 9 INJECTION INTRAMUSCULAR; INTRAVENOUS; SUBCUTANEOUS at 15:08

## 2021-08-03 RX ADMIN — TAMSULOSIN HYDROCHLORIDE 0.4 MILLIGRAM(S): 0.4 CAPSULE ORAL at 06:17

## 2021-08-03 RX ADMIN — Medication 2 MILLIGRAM(S): at 12:00

## 2021-08-03 RX ADMIN — SENNA PLUS 2 TABLET(S): 8.6 TABLET ORAL at 21:53

## 2021-08-03 RX ADMIN — SODIUM CHLORIDE 3 MILLILITER(S): 9 INJECTION INTRAMUSCULAR; INTRAVENOUS; SUBCUTANEOUS at 21:46

## 2021-08-03 RX ADMIN — TAMSULOSIN HYDROCHLORIDE 0.4 MILLIGRAM(S): 0.4 CAPSULE ORAL at 21:53

## 2021-08-03 RX ADMIN — GABAPENTIN 400 MILLIGRAM(S): 400 CAPSULE ORAL at 06:17

## 2021-08-03 RX ADMIN — Medication 2 MILLIGRAM(S): at 20:00

## 2021-08-03 RX ADMIN — Medication 5 MILLIGRAM(S): at 18:37

## 2021-08-03 RX ADMIN — CHLORHEXIDINE GLUCONATE 5 MILLILITER(S): 213 SOLUTION TOPICAL at 18:38

## 2021-08-03 NOTE — DISCHARGE NOTE PROVIDER - PROVIDER TOKENS
PROVIDER:[TOKEN:[[id number]],SCHEDULEDAPPT:[08/11/2021],SCHEDULEDAPPTTIME:[10:00 AM]],PROVIDER:[TOKEN:[[id number]],FOLLOWUP:[2 weeks]],FREE:[LAST:[[place]],PHONE:[phone]],FAX:[(   )    -],FOLLOWUP:[1 month]]

## 2021-08-03 NOTE — PROGRESS NOTE ADULT - SUBJECTIVE AND OBJECTIVE BOX
24 hr events    Subjective:    Pain:   Nausea: [ ] YES [ ] NO            Vomiting: [ ] YES [ ] NO  Abd Pain: [ ] YES [ ] NO  Diarrhea: [ ] YES [ ] NO         Constipation: [ ] YES [ ] NO     Chest Pain: [ ] YES [ ] NO    SOB:  [ ] YES [ ] NO  Flatus: [ ] YES [ ] NO  BM: [ ] YES [ ] NO  Voiding: [ ] YES [ ] NO  Weakness: [ ] YES [ ] NO  Numbness: [ ] YES [ ] NO  Extremity coldness: [ ] YES [ ] NO  Claudication: [ ] YES [ ] NO  Extremity Swelling: [ ] YES [ ] NO  Ulcers: [ ] YES [ ] NO        Vital Signs Last 24 Hrs  T(C): 36.2 (03 Aug 2021 09:23), Max: 36.8 (02 Aug 2021 21:29)  T(F): 97.2 (03 Aug 2021 09:23), Max: 98.2 (02 Aug 2021 21:29)  HR: 71 (03 Aug 2021 12:45) (60 - 80)  BP: 144/65 (03 Aug 2021 12:45) (117/57 - 168/73)  BP(mean): 93 (03 Aug 2021 12:45) (80 - 132)  RR: 18 (03 Aug 2021 12:45) (18 - 22)  SpO2: 99% (03 Aug 2021 12:45) (96% - 100%)    I&O's Summary    02 Aug 2021 07:01  -  03 Aug 2021 07:00  --------------------------------------------------------  IN: 1110 mL / OUT: 2165 mL / NET: -1055 mL    03 Aug 2021 07:01  -  03 Aug 2021 12:57  --------------------------------------------------------  IN: 0 mL / OUT: 100 mL / NET: -100 mL        Physical Exam:  General: NAD, resting comfortably  Pulmonary: normal resp effort  Cardiovascular: NSR  Abdominal: soft, NT/ND  Extremities: WWP, normal strength, no clubbing/cyanosis/edema  Neuro: A/O x 3, no focal deficits, sensation normal  Pulses:   Right:                                                                  FEM [ ]2+ [ ]1+ [ ]doppler  POP [ ]2+ [ ]1+ [ ]doppler  DP [ ]2+ [ ]1+ [ ]doppler  PT[ ]2+ [ ]1+ [ ]doppler    Left:  FEM [ ]2+ [ ]1+ [ ]doppler    POP [ ]2+ [ ]1+ [ ]doppler   DP [ ]2+ [ ]1+ [ ]doppler  PT [ ]2+ [ ]1+ [ ]doppler    Lines/drains/tubes:    LABS:                        9.9    9.45  )-----------( 63       ( 02 Aug 2021 05:32 )             30.8     08-02    138  |  109<H>  |  38<H>  ----------------------------<  146<H>  4.0   |  23  |  1.58<H>    Ca    9.1      02 Aug 2021 03:43  Mg     2.3     08-02      PT/INR - ( 02 Aug 2021 03:43 )   PT: 14.7 sec;   INR: 1.24          PTT - ( 02 Aug 2021 03:43 )  PTT:28.8 sec      CAPILLARY BLOOD GLUCOSE          RADIOLOGY & ADDITIONAL TESTS:   24 hr events: no major events     Vital Signs Last 24 Hrs  T(C): 36.2 (03 Aug 2021 09:23), Max: 36.8 (02 Aug 2021 21:29)  T(F): 97.2 (03 Aug 2021 09:23), Max: 98.2 (02 Aug 2021 21:29)  HR: 71 (03 Aug 2021 12:45) (60 - 80)  BP: 144/65 (03 Aug 2021 12:45) (117/57 - 168/73)  BP(mean): 93 (03 Aug 2021 12:45) (80 - 132)  RR: 18 (03 Aug 2021 12:45) (18 - 22)  SpO2: 99% (03 Aug 2021 12:45) (96% - 100%)    I&O's Summary    02 Aug 2021 07:01  -  03 Aug 2021 07:00  --------------------------------------------------------  IN: 1110 mL / OUT: 2165 mL / NET: -1055 mL    03 Aug 2021 07:01  -  03 Aug 2021 12:57  --------------------------------------------------------  IN: 0 mL / OUT: 100 mL / NET: -100 mL        Physical Exam:  General: NAD, resting comfortably  Pulmonary: normal resp effort  Cardiovascular: NSR  Abdominal: soft, NT  Groin: incisions are c,d,i, no swelling, no erythema or purpura  Extremities: WWP, strength 5/5 in all 4 extremities   Neuro: Alert  LABS:                        9.9    9.45  )-----------( 63       ( 02 Aug 2021 05:32 )             30.8     08-02    138  |  109<H>  |  38<H>  ----------------------------<  146<H>  4.0   |  23  |  1.58<H>    Ca    9.1      02 Aug 2021 03:43  Mg     2.3     08-02      PT/INR - ( 02 Aug 2021 03:43 )   PT: 14.7 sec;   INR: 1.24          PTT - ( 02 Aug 2021 03:43 )  PTT:28.8 sec      CAPILLARY BLOOD GLUCOSE          RADIOLOGY & ADDITIONAL TESTS:

## 2021-08-03 NOTE — DISCHARGE NOTE PROVIDER - NSDCFUADDAPPT_GEN_ALL_CORE_FT
You will see Dr. Fernandez at his [place] location in 1 week   8/11/21 10am [address] [facility] Cardiac Surgery Office,  [facility]  (pt lives on , [facility] transfer)    Dr. Belcher (referring): please call and make an appointment in 1-2 weeks     Dr. Mcdermott (for Coumadin follow up): 1 month 9/8/21 10am [phone]

## 2021-08-03 NOTE — PROGRESS NOTE ADULT - NSICDXPILOT_GEN_ALL_CORE
[place] [address]
[place]

## 2021-08-03 NOTE — DISCHARGE NOTE PROVIDER - HOSPITAL COURSE
89 year old male with history of Essential Tremor (on Propranolol), spinal stenosis, DVT s/p IVC filter (on Warfarin, last dose 7/26/21), cholecystectomy, TURP, s/p PPM who initially presented to urgent care with lower back pain, GI upset with poor PO intake and reported 12lb weight loss.  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 He was admitted for BP control and further evaluation, was referred to Dr. Fernandez for surgical evaluation and was transferred to [facility] on 7/28/21 to complete pre-surgical testing. ICU Course: lacy placed by urology preop, cardene post op, volume given POD1 for hypotension, started on midodrine POD2, failed TOV with replacement of lacy by  on POD3 . POD5 patient transferred to [address] for continued care. POD6 patient worked with PT this afternoon with son bedside, family and patient feel comfortable and safe for discharge.     Patient was seen and examined this morning, care was discussed with Dr. Fernandez and deemed medically appropriate for discharge with outpatient follow up. Patient was hemodynamically stable and afebrile overnight and feels comfortable being discharged home this AM.     Over 40 minutes was spent with the patient reviewing the discharge material including medications, follow up appointments, recovery, concerning symptoms, and how to contact their health care providers if they have questions 89 year old male with history of Essential Tremor (on Propranolol), spinal stenosis, DVT s/p IVC filter (on Warfarin, last dose 7/26/21), cholecystectomy, TURP, s/p PPM who initially presented to urgent care with lower back pain, GI upset with poor PO intake and reported 12lb weight loss.  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 He was admitted for BP control and further evaluation, was referred to Dr. Fernandez for surgical evaluation and was transferred to [facility] on 7/28/21 to complete pre-surgical testing. ICU Course: lacy placed by urology preop, cardene post op, volume given POD1 for hypotension, started on midodrine POD2, failed TOV with replacement of lacy by  on POD3 . POD5 patient transferred to [address] for continued care. POD6 patient worked with PT this afternoon with son bedside, family and patient feel comfortable and safe for discharge.     Patient was seen and examined this morning, care was discussed with Dr. Fernandez and deemed medically appropriate for discharge with outpatient follow up. Patient was hemodynamically stable and afebrile overnight and feels comfortable being discharged home this AM.     Over 40 minutes was spent with the patient reviewing the discharge material including medications, follow up appointments, recovery, concerning symptoms, and how to contact their health care providers if they have questions.

## 2021-08-03 NOTE — DISCHARGE NOTE PROVIDER - NSDCCPCAREPLAN_GEN_ALL_CORE_FT
PRINCIPAL DISCHARGE DIAGNOSIS  Diagnosis: Thoracic aortic aneurysm  Assessment and Plan of Treatment:       SECONDARY DISCHARGE DIAGNOSES  Diagnosis: Hypotension  Assessment and Plan of Treatment: We STOPPED your medication that you were taking for hypertension due to low blood pressure.   we STARTED a medication called Midodrine to help keep your blood pressure up.   Please take your blood pressure in the AM, if your blood pressure SYSTOLIC or the TOP NUMBER is less than 120, please continue to take your medication. If you blood pressure SYTOLIC or TOP NUMBER is >120 please do not take your medication.   If you have any questions at all regarding this medication please call us at [phone]

## 2021-08-03 NOTE — DISCHARGE NOTE PROVIDER - CARE PROVIDERS DIRECT ADDRESSES
[email or web address],[email or web address],DirectAddress_Unknown

## 2021-08-03 NOTE — PROGRESS NOTE ADULT - ASSESSMENT
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 He is doing well. Denies abdominal pain. No groin hematoma, no swelling, no induration bilaterally. DP pulses +2 bilaterally.   Plan:   we will continue to follow  all care per primary team

## 2021-08-03 NOTE — DISCHARGE NOTE NURSING/CASE MANAGEMENT/SOCIAL WORK - PATIENT PORTAL LINK FT
You can access the FollowMyHealth Patient Portal offered by [facility] by registering at the following website: [email or web address]. By joining [facility]’s FollowMyHealth portal, you will also be able to view your health information using other applications (apps) compatible with our system.

## 2021-08-03 NOTE — DISCHARGE NOTE NURSING/CASE MANAGEMENT/SOCIAL WORK - NSDCFUADDAPPT_GEN_ALL_CORE_FT
You will see Dr. Fernandez at his [place] location in 1 week   8/11/21 10am [address] [facility] Cardiac Surgery Office,  [facility]  (pt lives on , [facility] transfer)    Dr. Belcher (referring): please call and make an appointment in 1-2 weeks     Dr. Mcdermott (for Coumadin follow up): 1 month 9/8/21 10am [phone]

## 2021-08-03 NOTE — DISCHARGE NOTE PROVIDER - NSDCCPTREATMENT_GEN_ALL_CORE_FT
PRINCIPAL PROCEDURE  Procedure: Second stage thoracic endovascular aortic repair (TEVAR)  Findings and Treatment:

## 2021-08-03 NOTE — PROGRESS NOTE ADULT - REASON FOR ADMISSION
Type B Aortic Dissection

## 2021-08-03 NOTE — PROGRESS NOTE ADULT - PROVIDER SPECIALTY LIST ADULT
Critical Care
Vascular Surgery
CT Surgery
Critical Care
Vascular Surgery
Vascular Surgery
Critical Care
Vascular Surgery
Critical Care
Vascular Surgery

## 2021-08-03 NOTE — PROGRESS NOTE ADULT - NUTRITIONAL ASSESSMENT
This patient has been assessed with a concern for Malnutrition and has been determined to have a diagnosis/diagnoses of Moderate protein-calorie malnutrition.    This patient is being managed with:   Diet DASH/TLC-  Sodium & Cholesterol Restricted  Supplement Feeding Modality:  Oral  Ensure Enlive Cans or Servings Per Day:  1       Frequency:  Daily  Entered: Jul 30 2021  4:08PM    
This patient has been assessed with a concern for Malnutrition and has been determined to have a diagnosis/diagnoses of Moderate protein-calorie malnutrition.    This patient is being managed with:   Diet DASH/TLC-  Sodium & Cholesterol Restricted  Supplement Feeding Modality:  Oral  Ensure Enlive Cans or Servings Per Day:  1       Frequency:  Daily  Entered: Jul 30 2021  4:08PM

## 2021-08-03 NOTE — DISCHARGE NOTE PROVIDER - DETAILS OF MALNUTRITION DIAGNOSIS/DIAGNOSES
This patient has been assessed with a concern for Malnutrition and was treated during this hospitalization for the following Nutrition diagnosis/diagnoses:     -  08/02/2021: Moderate protein-calorie malnutrition

## 2021-08-03 NOTE — DISCHARGE NOTE PROVIDER - NSDCMRMEDTOKEN_GEN_ALL_CORE_FT
atorvastatin 10 mg oral tablet: 1 tab(s) orally once a day (at bedtime)  famotidine 20 mg oral tablet: 1 tab(s) orally once a day  gabapentin 400 mg oral capsule: 1 cap(s) orally 2 times a day  gabapentin 600 mg oral tablet: 1 tab(s) orally once a day (at bedtime)  labetalol 200 mg oral tablet: 1 tab(s) orally every 8 hours  levothyroxine 50 mcg (0.05 mg) oral tablet: 1 tab(s) orally once a day  senna oral tablet: 2 tab(s) orally once a day (at bedtime)  topiramate 50 mg oral tablet: 1 tab(s) orally    acetaminophen 325 mg oral tablet: 2 tab(s) orally every 6 hours, As needed, Temp greater or equal to 38C (100.4F), Mild Pain (1 - 3)  atorvastatin 10 mg oral tablet: 1 tab(s) orally once a day (at bedtime)  Blood Pressure Cuff: 1  buccal 1 to 2 times a day   famotidine 20 mg oral tablet: 1 tab(s) orally once a day  Flomax 0.4 mg oral capsule: 1 cap(s) orally once a day (at bedtime)  gabapentin 400 mg oral capsule: 1 cap(s) orally 2 times a day  gabapentin 600 mg oral tablet: 1 tab(s) orally once a day (at bedtime)  levothyroxine 50 mcg (0.05 mg) oral tablet: 1 tab(s) orally once a day  midodrine 5 mg oral tablet: 1 tab(s) orally 3 times a day   propranolol 20 mg oral tablet: 1 tab(s) orally once a day (at bedtime)  senna oral tablet: 2 tab(s) orally once a day (at bedtime)  topiramate 50 mg oral tablet: 1 tab(s) orally once a day

## 2021-08-03 NOTE — DISCHARGE NOTE PROVIDER - CARE PROVIDER_API CALL
Teddy Fernandez)  Surgery; Thoracic and Cardiac Surgery  [address] [place] [address]  Phone: [phone]  Fax: [phone]  Scheduled Appointment: 08/11/2021 10:00 AM    AVINASH JORGENSEN  [id number]  [address] [place] [address]  Phone: ()-  Fax: ()-  Follow Up Time: 2 weeks    [place],   Phone: [phone]  Fax: (   )    -  Follow Up Time: 1 month

## 2021-08-05 ENCOUNTER — TRANSCRIPTION ENCOUNTER (OUTPATIENT)
Age: 86
End: 2021-08-05

## 2021-08-06 ENCOUNTER — TRANSCRIPTION ENCOUNTER (OUTPATIENT)
Age: 86
End: 2021-08-06

## 2021-08-09 DIAGNOSIS — Z90.49 ACQUIRED ABSENCE OF OTHER SPECIFIED PARTS OF DIGESTIVE TRACT: ICD-10-CM

## 2021-08-09 DIAGNOSIS — R00.1 BRADYCARDIA, UNSPECIFIED: ICD-10-CM

## 2021-08-09 DIAGNOSIS — Z88.1 ALLERGY STATUS TO OTHER ANTIBIOTIC AGENTS STATUS: ICD-10-CM

## 2021-08-09 DIAGNOSIS — R33.9 RETENTION OF URINE, UNSPECIFIED: ICD-10-CM

## 2021-08-09 DIAGNOSIS — J34.2 DEVIATED NASAL SEPTUM: ICD-10-CM

## 2021-08-09 DIAGNOSIS — I95.9 HYPOTENSION, UNSPECIFIED: ICD-10-CM

## 2021-08-09 DIAGNOSIS — E44.0 MODERATE PROTEIN-CALORIE MALNUTRITION: ICD-10-CM

## 2021-08-09 DIAGNOSIS — I71.2 THORACIC AORTIC ANEURYSM, WITHOUT RUPTURE: ICD-10-CM

## 2021-08-09 DIAGNOSIS — I82.409 ACUTE EMBOLISM AND THROMBOSIS OF UNSPECIFIED DEEP VEINS OF UNSPECIFIED LOWER EXTREMITY: ICD-10-CM

## 2021-08-09 DIAGNOSIS — Z79.899 OTHER LONG TERM (CURRENT) DRUG THERAPY: ICD-10-CM

## 2021-08-09 DIAGNOSIS — Z87.891 PERSONAL HISTORY OF NICOTINE DEPENDENCE: ICD-10-CM

## 2021-08-09 DIAGNOSIS — G25.0 ESSENTIAL TREMOR: ICD-10-CM

## 2021-08-09 DIAGNOSIS — I10 ESSENTIAL (PRIMARY) HYPERTENSION: ICD-10-CM

## 2021-08-09 DIAGNOSIS — K27.9 PEPTIC ULCER, SITE UNSPECIFIED, UNSPECIFIED AS ACUTE OR CHRONIC, WITHOUT HEMORRHAGE OR PERFORATION: ICD-10-CM

## 2021-08-09 DIAGNOSIS — E78.5 HYPERLIPIDEMIA, UNSPECIFIED: ICD-10-CM

## 2021-08-09 DIAGNOSIS — Z95.0 PRESENCE OF CARDIAC PACEMAKER: ICD-10-CM

## 2021-08-09 PROBLEM — Z09 POSTOP CHECK: Status: ACTIVE | Noted: 2021-08-09

## 2021-08-09 RX ORDER — LEVOTHYROXINE SODIUM 0.05 MG/1
50 TABLET ORAL DAILY
Qty: 30 | Refills: 0 | Status: ACTIVE | COMMUNITY

## 2021-08-09 RX ORDER — PROPRANOLOL HYDROCHLORIDE 20 MG/1
20 TABLET ORAL DAILY
Refills: 0 | Status: ACTIVE | COMMUNITY

## 2021-08-09 RX ORDER — FAMOTIDINE 20 MG/1
20 TABLET, FILM COATED ORAL DAILY
Qty: 30 | Refills: 0 | Status: ACTIVE | COMMUNITY

## 2021-08-09 RX ORDER — SENNOSIDES 8.6 MG TABLETS 8.6 MG/1
8.6 TABLET ORAL
Qty: 60 | Refills: 0 | Status: ACTIVE | COMMUNITY

## 2021-08-09 RX ORDER — TAMSULOSIN HYDROCHLORIDE 0.4 MG/1
0.4 CAPSULE ORAL
Qty: 90 | Refills: 0 | Status: ACTIVE | COMMUNITY

## 2021-08-09 RX ORDER — MIDODRINE HYDROCHLORIDE 5 MG/1
5 TABLET ORAL 3 TIMES DAILY
Qty: 90 | Refills: 0 | Status: ACTIVE | COMMUNITY

## 2021-08-10 ENCOUNTER — OUTPATIENT (OUTPATIENT)
Dept: OUTPATIENT SERVICES | Facility: HOSPITAL | Age: 86
LOS: 1 days | End: 2021-08-10
Payer: COMMERCIAL

## 2021-08-10 ENCOUNTER — APPOINTMENT (OUTPATIENT)
Dept: UROLOGY | Facility: CLINIC | Age: 86
End: 2021-08-10
Payer: MEDICARE

## 2021-08-10 VITALS
RESPIRATION RATE: 18 BRPM | SYSTOLIC BLOOD PRESSURE: 117 MMHG | TEMPERATURE: 98 F | HEIGHT: 73 IN | BODY MASS INDEX: 29.82 KG/M2 | WEIGHT: 225 LBS | DIASTOLIC BLOOD PRESSURE: 79 MMHG | HEART RATE: 81 BPM

## 2021-08-10 DIAGNOSIS — R33.8 OTHER RETENTION OF URINE: ICD-10-CM

## 2021-08-10 DIAGNOSIS — N40.0 BENIGN PROSTATIC HYPERPLASIA WITHOUT LOWER URINARY TRACT SYMPMS: ICD-10-CM

## 2021-08-10 PROCEDURE — 51700 IRRIGATION OF BLADDER: CPT

## 2021-08-10 PROCEDURE — 51798 US URINE CAPACITY MEASURE: CPT

## 2021-08-10 PROCEDURE — 99203 OFFICE O/P NEW LOW 30 MIN: CPT

## 2021-08-10 NOTE — REVIEW OF SYSTEMS
[Negative] : Heme/Lymph [Feeling Tired] : feeling tired [Wake up at night to urinate  How many times?  ___] : wakes up to urinate [unfilled] times during the night

## 2021-08-11 ENCOUNTER — APPOINTMENT (OUTPATIENT)
Dept: CARDIOTHORACIC SURGERY | Facility: CLINIC | Age: 86
End: 2021-08-11

## 2021-08-11 ENCOUNTER — OUTPATIENT (OUTPATIENT)
Dept: OUTPATIENT SERVICES | Facility: HOSPITAL | Age: 86
LOS: 1 days | End: 2021-08-11
Payer: COMMERCIAL

## 2021-08-11 ENCOUNTER — RESULT REVIEW (OUTPATIENT)
Age: 86
End: 2021-08-11

## 2021-08-11 VITALS
BODY MASS INDEX: 29.82 KG/M2 | HEIGHT: 73 IN | HEART RATE: 64 BPM | SYSTOLIC BLOOD PRESSURE: 135 MMHG | WEIGHT: 225 LBS | OXYGEN SATURATION: 96 % | TEMPERATURE: 97.3 F | RESPIRATION RATE: 16 BRPM | DIASTOLIC BLOOD PRESSURE: 72 MMHG

## 2021-08-11 DIAGNOSIS — Z98.890 OTHER SPECIFIED POSTPROCEDURAL STATES: ICD-10-CM

## 2021-08-11 DIAGNOSIS — Z09 ENCOUNTER FOR FOLLOW-UP EXAMINATION AFTER COMPLETED TREATMENT FOR CONDITIONS OTHER THAN MALIGNANT NEOPLASM: ICD-10-CM

## 2021-08-11 PROCEDURE — 71046 X-RAY EXAM CHEST 2 VIEWS: CPT

## 2021-08-11 PROCEDURE — 71046 X-RAY EXAM CHEST 2 VIEWS: CPT | Mod: 26

## 2021-08-12 PROBLEM — N40.0 BPH (BENIGN PROSTATIC HYPERPLASIA): Status: ACTIVE | Noted: 2019-10-10

## 2021-08-12 PROBLEM — R33.8 ACUTE URINARY RETENTION: Status: ACTIVE | Noted: 2021-08-12

## 2021-08-12 NOTE — PHYSICAL EXAM
[General Appearance - Well Developed] : well developed [General Appearance - Well Nourished] : well nourished [Edema] : no peripheral edema [] : no respiratory distress [Exaggerated Use Of Accessory Muscles For Inspiration] : no accessory muscle use [Abdomen Soft] : soft [Abdomen Tenderness] : non-tender [Abdomen Mass (___ Cm)] : no abdominal mass palpated [Penis Abnormality] : normal circumcised penis [Urinary Bladder Findings] : the bladder was normal on palpation [Scrotum] : the scrotum was normal [Normal Station and Gait] : the gait and station were normal for the patient's age [Skin Color & Pigmentation] : normal skin color and pigmentation [No Focal Deficits] : no focal deficits [Oriented To Time, Place, And Person] : oriented to person, place, and time

## 2021-08-12 NOTE — HISTORY OF PRESENT ILLNESS
[FreeTextEntry1] : here for management of Granda catheter.\par Underwent endovascular stent graft for AAA and unable to void post op.\par failed one TOV in hospital and sent home on Flomax.\par baseline has some frequency and urgency but no leakage and nocturia once. FOS decent without straining, intermittency or hesitancy. no h/o gross hematuria UTI or prior retention.\par had a TURP @30 years ago.

## 2021-08-30 DIAGNOSIS — N40.0 BENIGN PROSTATIC HYPERPLASIA WITHOUT LOWER URINARY TRACT SYMPTOMS: ICD-10-CM

## 2021-08-30 DIAGNOSIS — R33.8 OTHER RETENTION OF URINE: ICD-10-CM

## 2021-09-01 ENCOUNTER — APPOINTMENT (OUTPATIENT)
Dept: UROLOGY | Facility: CLINIC | Age: 86
End: 2021-09-01

## 2021-11-09 ENCOUNTER — APPOINTMENT (OUTPATIENT)
Dept: CT IMAGING | Facility: IMAGING CENTER | Age: 86
End: 2021-11-09
Payer: MEDICARE

## 2021-11-09 ENCOUNTER — OUTPATIENT (OUTPATIENT)
Dept: OUTPATIENT SERVICES | Facility: HOSPITAL | Age: 86
LOS: 1 days | End: 2021-11-09
Payer: COMMERCIAL

## 2021-11-09 DIAGNOSIS — Z98.890 OTHER SPECIFIED POSTPROCEDURAL STATES: ICD-10-CM

## 2021-11-09 DIAGNOSIS — Z00.8 ENCOUNTER FOR OTHER GENERAL EXAMINATION: ICD-10-CM

## 2021-11-09 PROCEDURE — 71275 CT ANGIOGRAPHY CHEST: CPT | Mod: 26

## 2021-11-09 PROCEDURE — 71275 CT ANGIOGRAPHY CHEST: CPT

## 2021-11-09 PROCEDURE — 74174 CTA ABD&PLVS W/CONTRAST: CPT | Mod: 26

## 2021-11-09 PROCEDURE — 82565 ASSAY OF CREATININE: CPT

## 2021-11-09 PROCEDURE — 74174 CTA ABD&PLVS W/CONTRAST: CPT

## 2021-11-17 ENCOUNTER — APPOINTMENT (OUTPATIENT)
Dept: CARDIOTHORACIC SURGERY | Facility: CLINIC | Age: 86
End: 2021-11-17
Payer: MEDICARE

## 2021-11-17 VITALS
RESPIRATION RATE: 17 BRPM | DIASTOLIC BLOOD PRESSURE: 73 MMHG | SYSTOLIC BLOOD PRESSURE: 115 MMHG | HEIGHT: 73 IN | WEIGHT: 225 LBS | OXYGEN SATURATION: 93 % | HEART RATE: 84 BPM | BODY MASS INDEX: 29.82 KG/M2 | TEMPERATURE: 96 F

## 2021-11-17 DIAGNOSIS — Z86.79 OTHER SPECIFIED POSTPROCEDURAL STATES: ICD-10-CM

## 2021-11-17 DIAGNOSIS — Z98.890 OTHER SPECIFIED POSTPROCEDURAL STATES: ICD-10-CM

## 2021-11-17 DIAGNOSIS — R07.9 CHEST PAIN, UNSPECIFIED: ICD-10-CM

## 2021-11-17 DIAGNOSIS — T82.310A BREAKDOWN (MECHANICAL) OF AORTIC (BIFURCATION) GRAFT (REPLACEMENT), INITIAL ENCOUNTER: ICD-10-CM

## 2021-11-17 PROCEDURE — 99213 OFFICE O/P EST LOW 20 MIN: CPT

## 2021-11-17 NOTE — ED ADULT NURSE NOTE - CHIEF COMPLAINT QUOTE
Discharge instructions reviewed with patient and family. Opportunity for questions and answers given. No further questions at this time.    Tolerating po fluids - vss - will plan for discharge pt c/o diarrhea , decreased PO intake, dizziness like the room is spinning x 8 days, fatigue, intermittent headache x 3 days. pt states diarrhea is not present as of today.  hx of pacemaker (on warfarin), venacava filter, tremor, dvt.

## 2021-11-22 ENCOUNTER — NON-APPOINTMENT (OUTPATIENT)
Age: 86
End: 2021-11-22

## 2022-09-20 NOTE — ED PROVIDER NOTE - DATE/TIME FOR CONVERSATION WITH CONSULTANT:
Appointment was moved from 10/26/22 to 10/17/22. No earlier appointments.   CT scan scheduled for 10/3/22 (ordered by PCP)    RP with new availability on 10/5/22   26-Jul-2021 14:05

## 2022-10-06 NOTE — REASON FOR VISIT
Group Topic: BH Activity Group    Date: 10/6/2022  Start Time: 1330  End Time: 1430  Facilitators: ANDREW López    Focus: Coping Cards  Number in attendance: 12    Method: Group   Attendance: Present  Participation: Active  Patient Response: Appropriate feedback, Attentive and Good eye contact  Mood: Normal  Affect: Type: Euthymic (normal mood)   Range: Full (normal)   Congruency: Congruent   Stability: Stable  Behavior/Socialization: Appropriate to group  Thought Process: Focused  Task Performance: Follows directions  Patient Evaluation: Independent - full participation       [Follow-Up: _____] : a [unfilled] follow-up visit

## 2023-09-05 NOTE — ED ADULT TRIAGE NOTE - DOMESTIC TRAVEL HIGH RISK QUESTION
Discharge Inst-Standard


Discharge Medications


New, Converted or Re-Newed RX:  Transmitted to Pharmacy





Patient Instructions/Follow Up


Plan of Care/Instructions/FU:  


 2 weeks [place]


Activity as Tolerated:  Yes


Discharge Diet:  Regular Diet











DAEV POWERS DO               Sep 5, 2023 09:11 No